# Patient Record
Sex: FEMALE | Race: WHITE | NOT HISPANIC OR LATINO | Employment: FULL TIME | URBAN - METROPOLITAN AREA
[De-identification: names, ages, dates, MRNs, and addresses within clinical notes are randomized per-mention and may not be internally consistent; named-entity substitution may affect disease eponyms.]

---

## 2017-10-27 ENCOUNTER — TELEPHONE (OUTPATIENT)
Dept: OBSTETRICS AND GYNECOLOGY | Age: 52
End: 2017-10-27

## 2018-07-03 ENCOUNTER — APPOINTMENT (OUTPATIENT)
Dept: WOMENS IMAGING | Facility: HOSPITAL | Age: 53
End: 2018-07-03

## 2018-07-03 ENCOUNTER — OFFICE VISIT (OUTPATIENT)
Dept: OBSTETRICS AND GYNECOLOGY | Age: 53
End: 2018-07-03

## 2018-07-03 VITALS
SYSTOLIC BLOOD PRESSURE: 118 MMHG | WEIGHT: 141 LBS | BODY MASS INDEX: 23.49 KG/M2 | HEIGHT: 65 IN | DIASTOLIC BLOOD PRESSURE: 70 MMHG

## 2018-07-03 DIAGNOSIS — Z78.0 MENOPAUSE: Primary | ICD-10-CM

## 2018-07-03 DIAGNOSIS — Z01.419 WELL WOMAN EXAM WITH ROUTINE GYNECOLOGICAL EXAM: ICD-10-CM

## 2018-07-03 DIAGNOSIS — N95.1 PERIMENOPAUSAL: ICD-10-CM

## 2018-07-03 DIAGNOSIS — R68.82 LIBIDO, DECREASED: ICD-10-CM

## 2018-07-03 PROCEDURE — 77067 SCR MAMMO BI INCL CAD: CPT | Performed by: RADIOLOGY

## 2018-07-03 PROCEDURE — 99396 PREV VISIT EST AGE 40-64: CPT | Performed by: OBSTETRICS & GYNECOLOGY

## 2018-07-03 PROCEDURE — 77063 BREAST TOMOSYNTHESIS BI: CPT | Performed by: RADIOLOGY

## 2018-07-03 RX ORDER — MECLIZINE HYDROCHLORIDE 25 MG/1
TABLET ORAL
COMMUNITY
Start: 2018-05-10

## 2018-07-03 RX ORDER — ONDANSETRON 4 MG/1
TABLET, FILM COATED ORAL
COMMUNITY
Start: 2018-05-10

## 2018-07-03 RX ORDER — HYDROCHLOROTHIAZIDE 25 MG/1
25 TABLET ORAL
COMMUNITY
Start: 2017-03-08 | End: 2020-09-28

## 2018-07-03 NOTE — PROGRESS NOTES
"Subjective   Chief Complaint   Patient presents with   • Gynecologic Exam     annual exam. c/o one episode a few weeks ago of spotting.      History of Present Illness    Yu Moise is a very pleasant  52 y.o. female who presents for annual exam.  , Mammo Exam today, Contraception pm, Exercise 3/week    C/o decreased libido, perimenop symptoms, irreg spottin once.    Obstetric History:  OB History     No data available         Menstrual History:     No LMP recorded. Patient has had an ablation.       Sexual History:       No past medical history on file.  No past surgical history on file.    SOCIAL Hx:      The following portions of the patient's history were reviewed and updated as appropriate: allergies, current medications, past family history, past medical history, past social history, past surgical history and problem list.    Review of Systems        Except as outlined in history of physical illness, patient denies any changes in her GYN, , GI systems.  All other systems reviewed are negative         Objective   Physical Exam    /70   Ht 165.1 cm (65\")   Wt 64 kg (141 lb)   Breastfeeding? No   BMI 23.46 kg/m²     General: Patient is alert and oriented and appears overall healthy  Neck: Is supple without thyromegaly, no carotid bruits and no lymphadenopathy  Lungs: Clear bilaterally, no wheezing, rhonchi, or rales.  Respiratory rate is normal  Breast: Even symmetrical, no lymphadenopathy, no retraction, no masses appreciated on either side  Heart: Regular rate and rhythm are appreciated, no murmurs or rubs are heard  Abdomen: Is soft, without organomegaly, bowel sounds are positive, there is no                                rebound or guarding and palpation does not produce any discomfort  Back: Nontender without CVA tenderness  Pelvic: External genitalia appear normal and consistent with mature female.  BUS normal              Urethra appears normal and without mass, bladder is nontender " and without any               Masses.               Vagina is clean dry without discharge and appears adequately estrogenized, no               lesions or masses are present                         Cervix is noninflamed without discharge or lesions.  There is no cervical motion             tenderness.                Uterus is nonenlarged, without tenderness, and no masses or abnormalities are  present               Adnexa are non-enlarged, non tender               Rectal exam reveals adequate sphincter tone and no masses or lesions are                     appreciated on digital rectal examination.     There is no problem list on file for this patient.               Assessment/Plan   Yu was seen today for gynecologic exam.    Diagnoses and all orders for this visit:    Menopause  -     Follicle Stimulating Hormone  -     Estradiol  -     Testosterone - total  -     Ambulatory Referral to Colorectal Surgery    Perimenopausal    Well woman exam with routine gynecological exam  -     IGP, Apt HPV,rfx 16 / 18,45    Libido, decreased    call with any more spottin    Annual Well Woman Exam    Discussed today's findings and concerns with patient in detail.  Continue to recommend regular exercise to include cardiovascular and resistance training and breast self-exam. Wellness lab, mammography, & pap smear, in accordance with age guidelines.  Dietary and caloric recommendations were discussed.        All of the patient's questions were addressed and answered, I have encouraged her to call for today's test results if she has not received them within 10 days.  Patient is advised to call with any change in her condition or with any other questions, otherwise return in 12 months for annual examination.

## 2018-07-04 LAB
ESTRADIOL SERPL-MCNC: <5 PG/ML
FSH SERPL-ACNC: 95.7 MIU/ML
TESTOST SERPL-MCNC: 5 NG/DL (ref 3–41)

## 2018-07-06 LAB
CYTOLOGIST CVX/VAG CYTO: NORMAL
CYTOLOGY CVX/VAG DOC THIN PREP: NORMAL
DX ICD CODE: NORMAL
HIV 1 & 2 AB SER-IMP: NORMAL
HPV I/H RISK 4 DNA CVX QL PROBE+SIG AMP: NEGATIVE
OTHER STN SPEC: NORMAL
PATH REPORT.FINAL DX SPEC: NORMAL
STAT OF ADQ CVX/VAG CYTO-IMP: NORMAL

## 2019-07-09 ENCOUNTER — PROCEDURE VISIT (OUTPATIENT)
Dept: OBSTETRICS AND GYNECOLOGY | Age: 54
End: 2019-07-09

## 2019-07-09 ENCOUNTER — OFFICE VISIT (OUTPATIENT)
Dept: OBSTETRICS AND GYNECOLOGY | Age: 54
End: 2019-07-09

## 2019-07-09 ENCOUNTER — APPOINTMENT (OUTPATIENT)
Dept: WOMENS IMAGING | Facility: HOSPITAL | Age: 54
End: 2019-07-09

## 2019-07-09 VITALS
SYSTOLIC BLOOD PRESSURE: 120 MMHG | HEIGHT: 64 IN | WEIGHT: 142 LBS | DIASTOLIC BLOOD PRESSURE: 70 MMHG | BODY MASS INDEX: 24.24 KG/M2

## 2019-07-09 DIAGNOSIS — F52.0 LACK OF LIBIDO: ICD-10-CM

## 2019-07-09 DIAGNOSIS — R63.5 WEIGHT GAIN FINDING: ICD-10-CM

## 2019-07-09 DIAGNOSIS — R41.3 DECLINE IN VERBAL MEMORY: ICD-10-CM

## 2019-07-09 DIAGNOSIS — Z01.419 ENCOUNTER FOR GYNECOLOGICAL EXAMINATION: Primary | ICD-10-CM

## 2019-07-09 DIAGNOSIS — Z12.31 VISIT FOR SCREENING MAMMOGRAM: Primary | ICD-10-CM

## 2019-07-09 DIAGNOSIS — Z71.89 COUNSELING FOR HORMONE REPLACEMENT THERAPY: ICD-10-CM

## 2019-07-09 PROCEDURE — 77067 SCR MAMMO BI INCL CAD: CPT | Performed by: OBSTETRICS & GYNECOLOGY

## 2019-07-09 PROCEDURE — 99396 PREV VISIT EST AGE 40-64: CPT | Performed by: OBSTETRICS & GYNECOLOGY

## 2019-07-09 PROCEDURE — 77067 SCR MAMMO BI INCL CAD: CPT | Performed by: RADIOLOGY

## 2019-07-09 RX ORDER — PREDNISONE 10 MG/1
TABLET ORAL
COMMUNITY
Start: 2019-04-05

## 2019-07-09 RX ORDER — VALACYCLOVIR HYDROCHLORIDE 1 G/1
2000 TABLET, FILM COATED ORAL
COMMUNITY
Start: 2016-10-26

## 2019-07-09 RX ORDER — BUPROPION HYDROCHLORIDE 150 MG/1
150 TABLET ORAL DAILY
COMMUNITY
Start: 2016-08-03 | End: 2021-10-28 | Stop reason: SDUPTHER

## 2019-07-09 RX ORDER — CETIRIZINE HYDROCHLORIDE 10 MG/1
10 TABLET ORAL DAILY
COMMUNITY

## 2019-07-09 RX ORDER — FLUTICASONE PROPIONATE 50 MCG
2 SPRAY, SUSPENSION (ML) NASAL DAILY
COMMUNITY

## 2019-07-09 NOTE — PROGRESS NOTES
"Subjective   Chief Complaint   Patient presents with   • Gynecologic Exam     Annual:last pap 7/2018,mammo here today,colonoscopy 2009,on compounded hrt      History of Present Illness    Yu Moise is a very pleasant  53 y.o. female who presents for annual exam.  , Mammo Exam today, , Exercise 5 times a week  Patient continues to struggle with some memory decline, not losing weight is easily she should, and decreased libido.  She has seen a compounding pharmacy and we reviewed the risk benefits of that.    Obstetric History:  OB History     No data available         Menstrual History:     No LMP recorded. Patient has had an ablation.       Sexual History:       No past medical history on file.  No past surgical history on file.    SOCIAL Hx:      The following portions of the patient's history were reviewed and updated as appropriate: allergies, current medications, past family history, past medical history, past social history, past surgical history and problem list.    Review of Systems        Except as outlined in history of physical illness, patient denies any changes in her GYN, , GI systems.  All other systems reviewed are negative         Objective   Physical Exam    /70   Ht 162.6 cm (64\")   Wt 64.4 kg (142 lb)   BMI 24.37 kg/m²     General: Patient is alert and oriented and appears overall healthy  Neck: Is supple without thyromegaly, no carotid bruits and no lymphadenopathy  Lungs: Clear bilaterally, no wheezing, rhonchi, or rales.  Respiratory rate is normal  Breast: Even symmetrical, no lymphadenopathy, no retraction, no masses appreciated on either side well-healed surgical scars  Heart: Regular rate and rhythm are appreciated, no murmurs or rubs are heard  Abdomen: Is soft, without organomegaly, bowel sounds are positive, there is no                                rebound or guarding and palpation does not produce any discomfort  Back: Nontender without CVA tenderness  Pelvic: " External genitalia appear normal and consistent with mature female.  BUS normal              Urethra appears normal and without mass, bladder is nontender and without any               Masses.               Vagina is clean dry without discharge and appears adequately estrogenized, no               lesions or masses are present                         Cervix is noninflamed without discharge or lesions.  There is no cervical motion             tenderness.                Uterus is nonenlarged, without tenderness, and no masses or abnormalities are  present               Adnexa are non-enlarged, non tender               Rectal exam reveals adequate sphincter tone and no masses or lesions are                     appreciated on digital rectal examination.      Annual Well Woman Exam     Patient Active Problem List   Diagnosis   • Weight gain finding   • Lack of libido   • Decline in verbal memory                Assessment/Plan   Yu was seen today for gynecologic exam.    Diagnoses and all orders for this visit:    Encounter for gynecological examination  -     IGP, Apt HPV,rfx 16 / 18,45  -     CBC (No Diff)  -     Comprehensive Metabolic Panel  -     DHEA-Sulfate  -     Estradiol  -     Progesterone  -     Testosterone (Free & Total), LC / MS  -     Lipid Panel  -     Thyroid Panel With TSH  -     Ambulatory Referral to Neuropsychology    Counseling for hormone replacement therapy  -     CBC (No Diff)  -     Comprehensive Metabolic Panel  -     DHEA-Sulfate  -     Estradiol  -     Progesterone  -     Testosterone (Free & Total), LC / MS  -     Lipid Panel  -     Thyroid Panel With TSH    Decline in verbal memory  Referral to neuropsychology  Lack of libido  Checking labs  Weight gain finding  Patient declined nutritional consultation        Discussed today's findings and concerns with patient.  Continue to recommend regular exercise including cardiovascular and resistance training as well as  breast self-exam.  Wellness lab, mammography, & pap smear, in accordance with age guidelines.        All of the patient's questions were addressed and answered, I have encouraged her to call for today's test results if she has not received them within 10 days.  Patient is advised to call with any change in her condition or with any other questions, otherwise return in 12 months for annual examination.

## 2019-07-11 LAB
CYTOLOGIST CVX/VAG CYTO: NORMAL
CYTOLOGY CVX/VAG DOC CYTO: NORMAL
CYTOLOGY CVX/VAG DOC THIN PREP: NORMAL
DX ICD CODE: NORMAL
HIV 1 & 2 AB SER-IMP: NORMAL
HPV I/H RISK 4 DNA CVX QL PROBE+SIG AMP: NEGATIVE
OTHER STN SPEC: NORMAL
STAT OF ADQ CVX/VAG CYTO-IMP: NORMAL

## 2019-07-12 LAB
ALBUMIN SERPL-MCNC: 4.3 G/DL (ref 3.5–5.2)
ALBUMIN/GLOB SERPL: 2 G/DL
ALP SERPL-CCNC: 53 U/L (ref 39–117)
ALT SERPL-CCNC: 23 U/L (ref 1–33)
AST SERPL-CCNC: 17 U/L (ref 1–32)
BILIRUB SERPL-MCNC: 0.5 MG/DL (ref 0.2–1.2)
BUN SERPL-MCNC: 9 MG/DL (ref 6–20)
BUN/CREAT SERPL: 14.8 (ref 7–25)
CALCIUM SERPL-MCNC: 9.8 MG/DL (ref 8.6–10.5)
CHLORIDE SERPL-SCNC: 100 MMOL/L (ref 98–107)
CHOLEST SERPL-MCNC: 233 MG/DL (ref 0–200)
CO2 SERPL-SCNC: 27.2 MMOL/L (ref 22–29)
CREAT SERPL-MCNC: 0.61 MG/DL (ref 0.57–1)
DHEA-S SERPL-MCNC: 11.2 UG/DL (ref 41.2–243.7)
ERYTHROCYTE [DISTWIDTH] IN BLOOD BY AUTOMATED COUNT: 12.1 % (ref 12.3–15.4)
ESTRADIOL SERPL-MCNC: <5 PG/ML
FT4I SERPL CALC-MCNC: 1.5 (ref 1.2–4.9)
GLOBULIN SER CALC-MCNC: 2.2 GM/DL
GLUCOSE SERPL-MCNC: 85 MG/DL (ref 65–99)
HCT VFR BLD AUTO: 44 % (ref 34–46.6)
HDLC SERPL-MCNC: 75 MG/DL (ref 40–60)
HGB BLD-MCNC: 13.7 G/DL (ref 12–15.9)
LDLC SERPL CALC-MCNC: 141 MG/DL (ref 0–100)
MCH RBC QN AUTO: 31.5 PG (ref 26.6–33)
MCHC RBC AUTO-ENTMCNC: 31.1 G/DL (ref 31.5–35.7)
MCV RBC AUTO: 101.1 FL (ref 79–97)
PLATELET # BLD AUTO: 283 10*3/MM3 (ref 140–450)
POTASSIUM SERPL-SCNC: 4.2 MMOL/L (ref 3.5–5.2)
PROGEST SERPL-MCNC: 1.2 NG/ML
PROT SERPL-MCNC: 6.5 G/DL (ref 6–8.5)
RBC # BLD AUTO: 4.35 10*6/MM3 (ref 3.77–5.28)
SODIUM SERPL-SCNC: 140 MMOL/L (ref 136–145)
T3RU NFR SERPL: 25 % (ref 24–39)
T4 SERPL-MCNC: 6 UG/DL (ref 4.5–12)
TESTOST FREE SERPL-MCNC: <0.2 PG/ML (ref 0–4.2)
TESTOST SERPL-MCNC: 41.5 NG/DL
TRIGL SERPL-MCNC: 83 MG/DL (ref 0–150)
TSH SERPL DL<=0.005 MIU/L-ACNC: 0.73 UIU/ML (ref 0.45–4.5)
VLDLC SERPL CALC-MCNC: 16.6 MG/DL
WBC # BLD AUTO: 9.55 10*3/MM3 (ref 3.4–10.8)

## 2019-07-15 ENCOUNTER — TELEPHONE (OUTPATIENT)
Dept: OBSTETRICS AND GYNECOLOGY | Age: 54
End: 2019-07-15

## 2019-07-15 NOTE — TELEPHONE ENCOUNTER
----- Message from Yogesh Nair MD sent at 7/15/2019  9:10 AM EDT -----  Please notify patient that her lab work is back and we can fax that to her compounding pharmacy

## 2019-11-27 ENCOUNTER — TELEPHONE (OUTPATIENT)
Dept: OBSTETRICS AND GYNECOLOGY | Age: 54
End: 2019-11-27

## 2020-09-28 ENCOUNTER — OFFICE VISIT (OUTPATIENT)
Dept: OBSTETRICS AND GYNECOLOGY | Age: 55
End: 2020-09-28

## 2020-09-28 VITALS
DIASTOLIC BLOOD PRESSURE: 62 MMHG | SYSTOLIC BLOOD PRESSURE: 118 MMHG | HEIGHT: 64 IN | BODY MASS INDEX: 24.41 KG/M2 | WEIGHT: 143 LBS

## 2020-09-28 DIAGNOSIS — F52.0 LACK OF LIBIDO: ICD-10-CM

## 2020-09-28 DIAGNOSIS — Z01.419 WELL WOMAN EXAM WITH ROUTINE GYNECOLOGICAL EXAM: Primary | ICD-10-CM

## 2020-09-28 DIAGNOSIS — Z12.11 COLON CANCER SCREENING: ICD-10-CM

## 2020-09-28 PROCEDURE — 99396 PREV VISIT EST AGE 40-64: CPT | Performed by: PHYSICIAN ASSISTANT

## 2020-09-28 NOTE — PROGRESS NOTES
Subjective     Chief Complaint   Patient presents with   • Gynecologic Exam     annual exam last pap 09/09/2019, m/g 07/09/2019 neg, c/scope 2009       History of Present Illness    Yu Moise is a 54 y.o. No obstetric history on file. who presents for annual exam.    She is doing ok  Notes decreased libido  Similar to last year  Was on bioidentical hormones but stopped them b/c she didn't notice a difference and it was costly  Has used testosterone topically in the past and thought she had some improvement in her sxs'    Memory loss has been noted, verbal recall  Referred to Neuropsychology last year but didn't go d/t money  Would like the name again and may f/u    Does stay active, walks, bikes and does yoga  Is eating right as well    Has PCP, keeps changing  Only goes when sick    Would like same labs ordered as last year   Would like to see how they look  Will add cholesterol level as well    Pt of Dr Nair  Obstetric History:  OB History    No obstetric history on file.        Menstrual History:     No LMP recorded. Patient has had an ablation.         Current contraception: post menopausal status  History of abnormal Pap smear: no  Received Gardasil immunization: no  Perform regular self breast exam: yes - mth;y  Family history of uterine or ovarian cancer: no  Family History of colon cancer: no  Family history of breast cancer: no    Mammogram: up to date.  Colonoscopy: recommended.  DEXA: not indicated.    Exercise: moderately active  Calcium/Vitamin D: adequate intake    The following portions of the patient's history were reviewed and updated as appropriate: allergies, current medications, past family history, past medical history, past social history, past surgical history and problem list.    Review of Systems   All other systems reviewed and are negative.      Review of Systems   Constitutional: Negative for fatigue.   Respiratory: Negative for shortness of breath.    Gastrointestinal: Negative  "for abdominal pain.   Genitourinary: Negative for dysuria.   Neurological: Negative for headaches.   Psychiatric/Behavioral: Negative for dysphoric mood.         Objective   Physical Exam    /62   Ht 162.6 cm (64\")   Wt 64.9 kg (143 lb)   Breastfeeding No   BMI 24.55 kg/m²   General:   alert, comfortable and no distress   Heart: regular rate and rhythm   Lungs: clear to auscultation bilaterally   Breast: normal appearance, no masses or tenderness, Inspection negative, No nipple retraction or dimpling, No nipple discharge or bleeding, No axillary or supraclavicular adenopathy, Normal to palpation without dominant masses, s/p reduction, scars noted and scar tissue detected, no abnormalities noted   Neck: no adenopathy and no thyromegaly   Abdomen: {normal findings: soft, non-tender   CVA: Not performed today   Pelvis: External genitalia: normal general appearance  Vaginal: normal mucosa without prolapse or lesions and atrophic mucosa  Cervix: normal appearance  Adnexa: normal bimanual exam  Uterus: normal single, nontender  Rectal: good sphincter tone, no masses, guaiac negative   Extremities: Not performed today   Neurologic: negative   Psychiatric: Normal affect, judgement, and mood     Assessment/Plan   Yu was seen today for gynecologic exam.    Diagnoses and all orders for this visit:    Well woman exam with routine gynecological exam  -     CBC & Differential  -     TSH  -     T4  -     T3, Uptake  -     T3 Uptake & FTI  -     Estradiol  -     Vitamin D 25 Hydroxy  -     Follicle Stimulating Hormone  -     Testosterone - total  -     Lipid Panel  -     DHEA-Sulfate  -     Comprehensive Metabolic Panel    Colon cancer screening  -     Ambulatory Referral to Gastroenterology    Lack of libido        All questions answered.  Breast self exam technique reviewed and patient encouraged to perform self-exam monthly.  Discussed healthy lifestyle modifications.  Recommended 30 minutes of aerobic exercise " five times per week.  Discussed calcium needs to prevent osteoporosis.      Labs ordered for pt  Referral sent for CSC  rx written for testosterone topically  C/w good diet and exercise

## 2020-09-29 ENCOUNTER — TELEPHONE (OUTPATIENT)
Dept: OBSTETRICS AND GYNECOLOGY | Age: 55
End: 2020-09-29

## 2020-09-29 LAB
25(OH)D3+25(OH)D2 SERPL-MCNC: 53 NG/ML (ref 30–100)
ALBUMIN SERPL-MCNC: 4.4 G/DL (ref 3.5–5.2)
ALBUMIN/GLOB SERPL: 2.4 G/DL
ALP SERPL-CCNC: 66 U/L (ref 39–117)
ALT SERPL-CCNC: 19 U/L (ref 1–33)
AST SERPL-CCNC: 20 U/L (ref 1–32)
BASOPHILS # BLD AUTO: 0.03 10*3/MM3 (ref 0–0.2)
BASOPHILS NFR BLD AUTO: 0.5 % (ref 0–1.5)
BILIRUB SERPL-MCNC: 0.4 MG/DL (ref 0–1.2)
BUN SERPL-MCNC: 16 MG/DL (ref 6–20)
BUN/CREAT SERPL: 18.6 (ref 7–25)
CALCIUM SERPL-MCNC: 9.3 MG/DL (ref 8.6–10.5)
CHLORIDE SERPL-SCNC: 102 MMOL/L (ref 98–107)
CHOLEST SERPL-MCNC: 217 MG/DL (ref 0–200)
CO2 SERPL-SCNC: 25.5 MMOL/L (ref 22–29)
CREAT SERPL-MCNC: 0.86 MG/DL (ref 0.57–1)
DHEA-S SERPL-MCNC: 10.5 UG/DL (ref 29.4–220.5)
EOSINOPHIL # BLD AUTO: 0.22 10*3/MM3 (ref 0–0.4)
EOSINOPHIL NFR BLD AUTO: 3.9 % (ref 0.3–6.2)
ERYTHROCYTE [DISTWIDTH] IN BLOOD BY AUTOMATED COUNT: 11.7 % (ref 12.3–15.4)
ESTRADIOL SERPL-MCNC: <5 PG/ML
FSH SERPL-ACNC: 105 MIU/ML
FT4I SERPL CALC-MCNC: 1.4 (ref 1.2–4.9)
GLOBULIN SER CALC-MCNC: 1.8 GM/DL
GLUCOSE SERPL-MCNC: 90 MG/DL (ref 65–99)
HCT VFR BLD AUTO: 40.9 % (ref 34–46.6)
HDLC SERPL-MCNC: 68 MG/DL (ref 40–60)
HGB BLD-MCNC: 13.9 G/DL (ref 12–15.9)
IMM GRANULOCYTES # BLD AUTO: 0.04 10*3/MM3 (ref 0–0.05)
IMM GRANULOCYTES NFR BLD AUTO: 0.7 % (ref 0–0.5)
LDLC SERPL CALC-MCNC: 125 MG/DL (ref 0–100)
LYMPHOCYTES # BLD AUTO: 1.81 10*3/MM3 (ref 0.7–3.1)
LYMPHOCYTES NFR BLD AUTO: 31.8 % (ref 19.6–45.3)
MCH RBC QN AUTO: 31.7 PG (ref 26.6–33)
MCHC RBC AUTO-ENTMCNC: 34 G/DL (ref 31.5–35.7)
MCV RBC AUTO: 93.2 FL (ref 79–97)
MONOCYTES # BLD AUTO: 0.4 10*3/MM3 (ref 0.1–0.9)
MONOCYTES NFR BLD AUTO: 7 % (ref 5–12)
NEUTROPHILS # BLD AUTO: 3.19 10*3/MM3 (ref 1.7–7)
NEUTROPHILS NFR BLD AUTO: 56.1 % (ref 42.7–76)
NRBC BLD AUTO-RTO: 0 /100 WBC (ref 0–0.2)
PLATELET # BLD AUTO: 284 10*3/MM3 (ref 140–450)
POTASSIUM SERPL-SCNC: 3.8 MMOL/L (ref 3.5–5.2)
PROT SERPL-MCNC: 6.2 G/DL (ref 6–8.5)
RBC # BLD AUTO: 4.39 10*6/MM3 (ref 3.77–5.28)
SODIUM SERPL-SCNC: 140 MMOL/L (ref 136–145)
T3RU NFR SERPL: 24 % (ref 24–39)
T4 SERPL-MCNC: 5.7 UG/DL (ref 4.5–12)
TESTOST SERPL-MCNC: <3 NG/DL (ref 3–41)
TRIGL SERPL-MCNC: 119 MG/DL (ref 0–150)
TSH SERPL DL<=0.005 MIU/L-ACNC: 1.07 UIU/ML (ref 0.27–4.2)
VLDLC SERPL CALC-MCNC: 23.8 MG/DL
WBC # BLD AUTO: 5.69 10*3/MM3 (ref 3.4–10.8)

## 2021-10-21 ENCOUNTER — TELEPHONE (OUTPATIENT)
Dept: OBSTETRICS AND GYNECOLOGY | Age: 56
End: 2021-10-21

## 2021-10-21 NOTE — TELEPHONE ENCOUNTER
Dr Nair pt  PT calls needing to schedule annual exam. Pt scheduled next week 10/28 with Maame Malloy. Pt asking her testosterone cream refill be sent in, please advise

## 2021-10-22 NOTE — TELEPHONE ENCOUNTER
Pt states pharmacy should have sent over a new refill request, last request was from 02/2021 and these are done every 6 months. Spoke to [facility] and they are sending a new refill request on pt testosterone cream today

## 2021-10-28 ENCOUNTER — OFFICE VISIT (OUTPATIENT)
Dept: OBSTETRICS AND GYNECOLOGY | Age: 56
End: 2021-10-28

## 2021-10-28 VITALS
DIASTOLIC BLOOD PRESSURE: 64 MMHG | WEIGHT: 145 LBS | BODY MASS INDEX: 25.69 KG/M2 | SYSTOLIC BLOOD PRESSURE: 118 MMHG | HEIGHT: 63 IN

## 2021-10-28 DIAGNOSIS — Z01.419 WELL WOMAN EXAM WITH ROUTINE GYNECOLOGICAL EXAM: Primary | ICD-10-CM

## 2021-10-28 DIAGNOSIS — Z78.0 POST-MENOPAUSAL: ICD-10-CM

## 2021-10-28 PROCEDURE — 99396 PREV VISIT EST AGE 40-64: CPT | Performed by: PHYSICIAN ASSISTANT

## 2021-10-28 RX ORDER — BUPROPION HYDROCHLORIDE 300 MG/1
TABLET ORAL
COMMUNITY
Start: 2021-08-20

## 2021-10-28 NOTE — PROGRESS NOTES
"Subjective     Chief Complaint   Patient presents with   • Gynecologic Exam     annual exam last pap 07/09/2019 neg/neg, m/g 2019       History of Present Illness    Yu Moise is a 55 y.o. No obstetric history on file. who presents for annual exam.    She is doing well  Trying to eat right and take care of herself  Is noting difficulty with maintaining a healthy weight  Tries to stay active, hikes, etc    Overdue for mammogram  Needs routine  labs with PCP  Last done with me last year  Did have slightly elevated cholesterol at that time    Obstetric History:  OB History    No obstetric history on file.        Menstrual History:     No LMP recorded. Patient has had an ablation.         Current contraception: post menopausal status  History of abnormal Pap smear: no  Received Gardasil immunization: no  Perform regular self breast exam: yes - occl  Family history of uterine or ovarian cancer: no  Family History of colon cancer: no  Family history of breast cancer: no    Mammogram: ordered.  Colonoscopy: recommended.  DEXA: not indicated.    Exercise: moderately active  Calcium/Vitamin D: adequate intake    The following portions of the patient's history were reviewed and updated as appropriate: allergies, current medications, past family history, past medical history, past social history, past surgical history and problem list.    Review of Systems    Review of Systems   Constitutional: Negative for fatigue.   Respiratory: Negative for shortness of breath.    Gastrointestinal: Negative for abdominal pain.   Genitourinary: Negative for dysuria.   Neurological: Negative for headaches.   Psychiatric/Behavioral: Negative for dysphoric mood.         Objective   Physical Exam    /64   Ht 160 cm (63\")   Wt 65.8 kg (145 lb)   Breastfeeding No   BMI 25.69 kg/m²   General:   alert, comfortable and no distress   Heart: regular rate and rhythm   Lungs: clear to auscultation bilaterally   Breast: normal " appearance, no masses or tenderness, Inspection negative, No nipple retraction or dimpling, No nipple discharge or bleeding, No axillary or supraclavicular adenopathy, Normal to palpation without dominant masses   Neck: no adenopathy and no carotid bruit   Abdomen: {normal findings: soft, non-tender   CVA: Not performed today   Pelvis: External genitalia: normal general appearance  Vaginal: normal mucosa without prolapse or lesions  Cervix: normal appearance  Adnexa: normal bimanual exam  Uterus: normal single, nontender   Extremities: Not performed today   Neurologic: negative   Psychiatric: Normal affect, judgement, and mood     Assessment/Plan   Diagnoses and all orders for this visit:    1. Well woman exam with routine gynecological exam (Primary)    2. Post-menopausal        All questions answered.  Breast self exam technique reviewed and patient encouraged to perform self-exam monthly.  Discussed healthy lifestyle modifications.  Recommended 30 minutes of aerobic exercise five times per week.  Discussed calcium needs to prevent osteoporosis.    Pap utd  Mammogram recommended  C/w good diet and exercise   Fu with PCP for routine exam

## 2022-08-23 ENCOUNTER — TELEPHONE (OUTPATIENT)
Dept: OBSTETRICS AND GYNECOLOGY | Age: 57
End: 2022-08-23

## 2022-11-14 ENCOUNTER — OFFICE VISIT (OUTPATIENT)
Dept: OBSTETRICS AND GYNECOLOGY | Age: 57
End: 2022-11-14

## 2022-11-14 VITALS
HEIGHT: 63 IN | WEIGHT: 144 LBS | DIASTOLIC BLOOD PRESSURE: 78 MMHG | SYSTOLIC BLOOD PRESSURE: 122 MMHG | BODY MASS INDEX: 25.52 KG/M2

## 2022-11-14 DIAGNOSIS — Z13.0 SCREENING FOR IRON DEFICIENCY ANEMIA: ICD-10-CM

## 2022-11-14 DIAGNOSIS — Z12.11 SCREENING FOR COLON CANCER: ICD-10-CM

## 2022-11-14 DIAGNOSIS — Z11.51 SCREENING FOR HPV (HUMAN PAPILLOMAVIRUS): ICD-10-CM

## 2022-11-14 DIAGNOSIS — R41.3 DECLINE IN VERBAL MEMORY: ICD-10-CM

## 2022-11-14 DIAGNOSIS — Z13.220 SCREENING FOR CHOLESTEROL LEVEL: ICD-10-CM

## 2022-11-14 DIAGNOSIS — Z13.29 SCREENING FOR THYROID DISORDER: ICD-10-CM

## 2022-11-14 DIAGNOSIS — Z13.1 SCREENING FOR DIABETES MELLITUS: ICD-10-CM

## 2022-11-14 DIAGNOSIS — N95.1 MENOPAUSAL SYMPTOMS: ICD-10-CM

## 2022-11-14 DIAGNOSIS — Z12.4 ENCOUNTER FOR PAPANICOLAOU SMEAR FOR CERVICAL CANCER SCREENING: ICD-10-CM

## 2022-11-14 DIAGNOSIS — Z01.419 WELL WOMAN EXAM WITH ROUTINE GYNECOLOGICAL EXAM: Primary | ICD-10-CM

## 2022-11-14 PROCEDURE — 99396 PREV VISIT EST AGE 40-64: CPT | Performed by: PHYSICIAN ASSISTANT

## 2022-11-14 RX ORDER — HYDROCHLOROTHIAZIDE 25 MG/1
25 TABLET ORAL EVERY MORNING
COMMUNITY
Start: 2022-10-25

## 2022-11-14 RX ORDER — ESTRADIOL/NORETHINDRONE ACETATE TRANSDERMAL SYSTEM .05; .14 MG/D; MG/D
1 PATCH, EXTENDED RELEASE TRANSDERMAL 2 TIMES WEEKLY
Qty: 8 EACH | Refills: 3 | Status: SHIPPED | OUTPATIENT
Start: 2022-11-14 | End: 2022-11-14

## 2022-11-14 RX ORDER — ESTRADIOL AND NORETHINDRONE ACETATE .5; .1 MG/1; MG/1
1 TABLET ORAL DAILY
Qty: 30 TABLET | Refills: 12 | Status: SHIPPED | OUTPATIENT
Start: 2022-11-14 | End: 2023-11-14

## 2022-11-14 NOTE — PROGRESS NOTES
"Subjective     Chief Complaint   Patient presents with   • Gynecologic Exam     Annual exam last pap 07/09/2019 neg/neg, m/g needs to be scheduled. C/scope never       History of Present Illness    Yu Moise is a 56 y.o. No obstetric history on file. who presents for annual exam.    She is here for annual exam    Has noted some attention issues  \"lack of attention to detail\"  Correlates it with onset of MP  Not on HT  Uses testosterone clitorally for decreased libido  Would be interested in trying HT for memory improvement    She is healthy  Does see pcp routinely  Requests a panel of labs that she gets yearly as she likes to compare results    Overdue for CSC  Is open to getting it done    Does need to schedule mammogram     Obstetric History:  OB History    No obstetric history on file.        Menstrual History:     No LMP recorded. Patient has had an ablation.         Current contraception: post menopausal status  History of abnormal Pap smear: no  Received Gardasil immunization: no  Perform regular self breast exam: yes - mthly  Family history of uterine or ovarian cancer: no  Family History of colon cancer: no  Family history of breast cancer: no    Mammogram: ordered.  Colonoscopy: recommended.  DEXA: not indicated.    Exercise: moderately active or not active  Calcium/Vitamin D: adequate intake    The following portions of the patient's history were reviewed and updated as appropriate: allergies, current medications, past family history, past medical history, past social history, past surgical history and problem list.    Review of Systems   Psychiatric/Behavioral:        Attention issues     All other systems reviewed and are negative.      Review of Systems   Constitutional: Negative for fatigue.   Respiratory: Negative for shortness of breath.    Gastrointestinal: Negative for abdominal pain.   Genitourinary: Negative for dysuria.   Neurological: Negative for headaches.   Psychiatric/Behavioral: " "Negative for dysphoric mood.         Objective   Physical Exam    /78   Ht 160 cm (63\")   Wt 65.3 kg (144 lb)   BMI 25.51 kg/m²   General:   alert, comfortable and no distress   Heart: regular rate and rhythm   Lungs: clear to auscultation bilaterally   Breast: normal appearance, no masses or tenderness, Inspection negative, No nipple retraction or dimpling, No nipple discharge or bleeding, No axillary or supraclavicular adenopathy, Normal to palpation without dominant masses, positive findings: fibrocystic changes   Neck: no adenopathy and no carotid bruit   Abdomen: normal findings: soft, non-tender   CVA: Not performed today   Pelvis: External genitalia: normal general appearance  Vaginal: normal mucosa without prolapse or lesions  Cervix: normal appearance and thin prep PAP obtained  Adnexa: normal bimanual exam  Uterus: normal single, nontender   Extremities: Not performed today   Neurologic: negative   Psychiatric: Normal affect, judgement, and mood     Assessment & Plan   Diagnoses and all orders for this visit:    1. Well woman exam with routine gynecological exam (Primary)    2. Screening for colon cancer  -     Ambulatory Referral For Screening Colonoscopy    3. Screening for diabetes mellitus  -     Comprehensive Metabolic Panel    4. Screening for thyroid disorder  -     TSH  -     T4  -     T3, Uptake  -     T3 Uptake & FTI    5. Screening for cholesterol level  -     Lipid Panel    6. Decline in verbal memory  -     Vitamin D 25 Hydroxy    7. Screening for iron deficiency anemia  -     CBC & Differential    8. Menopausal symptoms  -     DHEA-Sulfate  -     Testosterone - total    9. Encounter for Papanicolaou smear for cervical cancer screening  -     IGP, Aptima HPV, Rfx 16 / 18,45    10. Screening for HPV (human papillomavirus)  -     IGP, Aptima HPV, Rfx 16 / 18,45    Other orders  -     estradiol-norethindrone (CombiPatch) 0.05-0.14 MG/DAY patch; Place 1 patch on the skin as directed by " provider 2 (Two) Times a Week.  Dispense: 8 each; Refill: 3        All questions answered.  Breast self exam technique reviewed and patient encouraged to perform self-exam monthly.  Discussed healthy lifestyle modifications.  Recommended 30 minutes of aerobic exercise five times per week.  Discussed calcium needs to prevent osteoporosis.    Pap done  Referral for [facility] labs and hormone labs done at pt request  Start HRT for memory changes, disc r/b/a of HT use. Pt aware and wishes to proceed

## 2022-11-15 LAB
25(OH)D3+25(OH)D2 SERPL-MCNC: 55.3 NG/ML (ref 30–100)
ALBUMIN SERPL-MCNC: 4.1 G/DL (ref 3.5–5.2)
ALBUMIN/GLOB SERPL: 2 G/DL
ALP SERPL-CCNC: 59 U/L (ref 39–117)
ALT SERPL-CCNC: 18 U/L (ref 1–33)
AST SERPL-CCNC: 16 U/L (ref 1–32)
BASOPHILS # BLD AUTO: 0.03 10*3/MM3 (ref 0–0.2)
BASOPHILS NFR BLD AUTO: 0.5 % (ref 0–1.5)
BILIRUB SERPL-MCNC: 0.5 MG/DL (ref 0–1.2)
BUN SERPL-MCNC: 13 MG/DL (ref 6–20)
BUN/CREAT SERPL: 12.5 (ref 7–25)
CALCIUM SERPL-MCNC: 9.6 MG/DL (ref 8.6–10.5)
CHLORIDE SERPL-SCNC: 103 MMOL/L (ref 98–107)
CHOLEST SERPL-MCNC: 242 MG/DL (ref 0–200)
CO2 SERPL-SCNC: 28.8 MMOL/L (ref 22–29)
CREAT SERPL-MCNC: 1.04 MG/DL (ref 0.57–1)
DHEA-S SERPL-MCNC: 9.5 UG/DL (ref 29.4–220.5)
EGFRCR SERPLBLD CKD-EPI 2021: 62.8 ML/MIN/1.73
EOSINOPHIL # BLD AUTO: 0.44 10*3/MM3 (ref 0–0.4)
EOSINOPHIL NFR BLD AUTO: 7.1 % (ref 0.3–6.2)
ERYTHROCYTE [DISTWIDTH] IN BLOOD BY AUTOMATED COUNT: 11.5 % (ref 12.3–15.4)
FT4I SERPL CALC-MCNC: 1.3 (ref 1.2–4.9)
GLOBULIN SER CALC-MCNC: 2.1 GM/DL
GLUCOSE SERPL-MCNC: 88 MG/DL (ref 65–99)
HCT VFR BLD AUTO: 42.8 % (ref 34–46.6)
HDLC SERPL-MCNC: 60 MG/DL (ref 40–60)
HGB BLD-MCNC: 14.4 G/DL (ref 12–15.9)
IMM GRANULOCYTES # BLD AUTO: 0.03 10*3/MM3 (ref 0–0.05)
IMM GRANULOCYTES NFR BLD AUTO: 0.5 % (ref 0–0.5)
LDLC SERPL CALC-MCNC: 165 MG/DL (ref 0–100)
LYMPHOCYTES # BLD AUTO: 1.99 10*3/MM3 (ref 0.7–3.1)
LYMPHOCYTES NFR BLD AUTO: 32 % (ref 19.6–45.3)
MCH RBC QN AUTO: 31.6 PG (ref 26.6–33)
MCHC RBC AUTO-ENTMCNC: 33.6 G/DL (ref 31.5–35.7)
MCV RBC AUTO: 94.1 FL (ref 79–97)
MONOCYTES # BLD AUTO: 0.47 10*3/MM3 (ref 0.1–0.9)
MONOCYTES NFR BLD AUTO: 7.6 % (ref 5–12)
NEUTROPHILS # BLD AUTO: 3.26 10*3/MM3 (ref 1.7–7)
NEUTROPHILS NFR BLD AUTO: 52.3 % (ref 42.7–76)
NRBC BLD AUTO-RTO: 0 /100 WBC (ref 0–0.2)
PLATELET # BLD AUTO: 321 10*3/MM3 (ref 140–450)
POTASSIUM SERPL-SCNC: 4.5 MMOL/L (ref 3.5–5.2)
PROT SERPL-MCNC: 6.2 G/DL (ref 6–8.5)
RBC # BLD AUTO: 4.55 10*6/MM3 (ref 3.77–5.28)
SODIUM SERPL-SCNC: 140 MMOL/L (ref 136–145)
T3RU NFR SERPL: 25 % (ref 24–39)
T4 SERPL-MCNC: 5 UG/DL (ref 4.5–12)
TESTOST SERPL-MCNC: <3 NG/DL (ref 4–50)
TRIGL SERPL-MCNC: 95 MG/DL (ref 0–150)
TSH SERPL DL<=0.005 MIU/L-ACNC: 1.44 UIU/ML (ref 0.27–4.2)
VLDLC SERPL CALC-MCNC: 17 MG/DL (ref 5–40)
WBC # BLD AUTO: 6.22 10*3/MM3 (ref 3.4–10.8)

## 2022-11-21 LAB
CYTOLOGIST CVX/VAG CYTO: NORMAL
CYTOLOGY CVX/VAG DOC CYTO: NORMAL
CYTOLOGY CVX/VAG DOC THIN PREP: NORMAL
DX ICD CODE: NORMAL
HIV 1 & 2 AB SER-IMP: NORMAL
HPV GENOTYPE REFLEX: NORMAL
HPV I/H RISK 4 DNA CVX QL PROBE+SIG AMP: NEGATIVE
OTHER STN SPEC: NORMAL
STAT OF ADQ CVX/VAG CYTO-IMP: NORMAL

## 2022-12-06 ENCOUNTER — PROCEDURE VISIT (OUTPATIENT)
Dept: OBSTETRICS AND GYNECOLOGY | Age: 57
End: 2022-12-06

## 2022-12-06 ENCOUNTER — APPOINTMENT (OUTPATIENT)
Dept: WOMENS IMAGING | Facility: HOSPITAL | Age: 57
End: 2022-12-06

## 2022-12-06 DIAGNOSIS — Z12.31 VISIT FOR SCREENING MAMMOGRAM: Primary | ICD-10-CM

## 2022-12-06 PROCEDURE — 77067 SCR MAMMO BI INCL CAD: CPT | Performed by: PHYSICIAN ASSISTANT

## 2022-12-06 PROCEDURE — 77063 BREAST TOMOSYNTHESIS BI: CPT | Performed by: PHYSICIAN ASSISTANT

## 2022-12-06 PROCEDURE — 77067 SCR MAMMO BI INCL CAD: CPT | Performed by: RADIOLOGY

## 2022-12-08 ENCOUNTER — PATIENT MESSAGE (OUTPATIENT)
Dept: OBSTETRICS AND GYNECOLOGY | Age: 57
End: 2022-12-08

## 2022-12-08 NOTE — TELEPHONE ENCOUNTER
From: Yu Moise  To: RICHMOND Forbes  Sent: 12/8/2022 8:44 AM EST  Subject: Compound Pharmacy waiting for approval    Aron Isabel, [facility] says they have submitted for a refill on the testosterone cream three times and has not heard back. Would you mind calling that in? The number is [phone]. Thank you,  Yu Moise (Hope)

## 2023-07-26 ENCOUNTER — PATIENT MESSAGE (OUTPATIENT)
Dept: OBSTETRICS AND GYNECOLOGY | Age: 58
End: 2023-07-26
Payer: COMMERCIAL

## 2023-07-26 NOTE — TELEPHONE ENCOUNTER
From: Yu Moise  To: Yogesh Nair  Sent: 7/26/2023 10:11 AM EDT  Subject: Bioidentical hrt    Hi Dr. Nair,  My last visit I saw Maame and she prescribed a combo patch. I had severe cramping and did not take it after trying the first few patches. I spoke to the pharmacist at [facility] and she said with your ok, I can have a consult with them for hormone replacement. You already have been prescribing testosterone cream I get from them and I would like to try this option.    On top of my brain fog and low libido I am now starting to hold on to weight around my middle despite a healthy diet and regular exercise. If I make an appointment with them, is that ok with you?    Thanks for your help,  Taylor Moise

## 2023-08-09 ENCOUNTER — TELEPHONE (OUTPATIENT)
Dept: OBSTETRICS AND GYNECOLOGY | Age: 58
End: 2023-08-09
Payer: COMMERCIAL

## 2023-08-09 NOTE — TELEPHONE ENCOUNTER
PROVIDER: DR. STOCKTON    Caller: Yu Moise    Relationship: Self    Best call back number: [phone]    What form or medical record are you requesting: NEEDS BLOODWORK RESULTS FAXED TO HER    Who is requesting this form or medical record from you: [facility]    How would you like to receive the form or medical records (pick-up, mail, fax): FAX  If fax, what is the fax number: [phone]  If mail, what is the address:   If pick-up, provide patient with address and location details    Timeframe paperwork needed: WHEN YOU CAN BUT THEY ARE WAITING FOR THIS    Additional notes:

## 2024-03-18 DIAGNOSIS — R53.83 OTHER FATIGUE: ICD-10-CM

## 2024-03-18 DIAGNOSIS — Z13.1 SCREENING FOR DIABETES MELLITUS: ICD-10-CM

## 2024-03-18 DIAGNOSIS — Z13.29 SCREENING FOR THYROID DISORDER: ICD-10-CM

## 2024-03-18 DIAGNOSIS — N95.1 MENOPAUSAL SYMPTOMS: Primary | ICD-10-CM

## 2024-03-25 ENCOUNTER — TELEPHONE (OUTPATIENT)
Dept: OBSTETRICS AND GYNECOLOGY | Age: 59
End: 2024-03-25
Payer: COMMERCIAL

## 2024-03-25 LAB
25(OH)D3+25(OH)D2 SERPL-MCNC: 40.3 NG/ML (ref 30–100)
DHEA-S SERPL-MCNC: 9.8 UG/DL (ref 29.4–220.5)
ESTRADIOL SERPL-MCNC: <5 PG/ML
FERRITIN SERPL-MCNC: 128 NG/ML (ref 15–150)
FT4I SERPL CALC-MCNC: 2.1 (ref 1.2–4.9)
GLUCOSE SERPL-MCNC: 73 MG/DL (ref 70–99)
HBA1C MFR BLD: 5.7 % (ref 4.8–5.6)
INSULIN SERPL-ACNC: 6.6 UIU/ML (ref 2.6–24.9)
PROGEST SERPL-MCNC: 0.1 NG/ML
T3RU NFR SERPL: 26 % (ref 24–39)
T4 SERPL-MCNC: 8.2 UG/DL (ref 4.5–12)
TESTOST FREE SERPL-MCNC: 4.3 PG/ML (ref 0–4.2)
TESTOST SERPL-MCNC: 616 NG/DL (ref 4–50)
TSH SERPL DL<=0.005 MIU/L-ACNC: 1.57 UIU/ML (ref 0.45–4.5)

## 2024-04-24 ENCOUNTER — OFFICE VISIT (OUTPATIENT)
Dept: OBSTETRICS AND GYNECOLOGY | Age: 59
End: 2024-04-24
Payer: COMMERCIAL

## 2024-04-24 VITALS
WEIGHT: 136 LBS | BODY MASS INDEX: 24.1 KG/M2 | SYSTOLIC BLOOD PRESSURE: 124 MMHG | HEIGHT: 63 IN | DIASTOLIC BLOOD PRESSURE: 76 MMHG

## 2024-04-24 DIAGNOSIS — Z12.9 CANCER SCREENING: ICD-10-CM

## 2024-04-24 DIAGNOSIS — Z01.419 WELL WOMAN EXAM WITH ROUTINE GYNECOLOGICAL EXAM: Primary | ICD-10-CM

## 2024-04-24 DIAGNOSIS — Z12.31 BREAST CANCER SCREENING BY MAMMOGRAM: ICD-10-CM

## 2024-04-24 RX ORDER — VALACYCLOVIR HYDROCHLORIDE 1 G/1
2000 TABLET, FILM COATED ORAL DAILY
Qty: 30 TABLET | Refills: 3 | Status: SHIPPED | OUTPATIENT
Start: 2024-04-24

## 2024-04-24 NOTE — PROGRESS NOTES
Subjective     Chief Complaint   Patient presents with    Annual Exam     Annual exam last pap 11/14/2022 neg/neg, m/g 12/06/2022 (needs to schedule) , colonoscopy 2024, (rx refills of valtrex sent to [facility])       History of Present Illness    Yu Moise is a 58 y.o. No obstetric history on file. who presents for annual exam.    She is doing well    Is considering the genetic testing  Does not know her family history so she would like to get the testing done  Aware it would be cash pay    Did have a full w/u of cardiac health last year  Everything came back normal  Was having palpitations, likely hormone related    She did start HT through compounding pharmacy  Is feeling better   I refilled recently    Would like refill of valtrex, uses prn cold sore    Mammogram due  Will scheduled  CSC done recently  Pap utd    Obstetric History:  OB History    No obstetric history on file.        Menstrual History:     No LMP recorded. Patient is postmenopausal.         Current contraception: post menopausal status  History of abnormal Pap smear: no  Received Gardasil immunization: no  Perform regular self breast exam: yes - occl  Family history of uterine or ovarian cancer: no  Family History of colon cancer: no  Family history of breast cancer: no    Mammogram: ordered.  Colonoscopy: up to date.  DEXA: ordered.    Exercise: moderately active  Calcium/Vitamin D: adequate intake    The following portions of the patient's history were reviewed and updated as appropriate: allergies, current medications, past family history, past medical history, past social history, past surgical history, and problem list.    Review of Systems   All other systems reviewed and are negative.      Review of Systems   Constitutional: Negative for fatigue.   Respiratory: Negative for shortness of breath.    Gastrointestinal: Negative for abdominal pain.   Genitourinary: Negative for dysuria.   Neurological: Negative for headaches.  "  Psychiatric/Behavioral: Negative for dysphoric mood.         Objective   Physical Exam    /76   Ht 160 cm (63\")   Wt 61.7 kg (136 lb)   BMI 24.09 kg/m²   General:   alert, comfortable, and no distress   Heart: regular rate and rhythm   Lungs: clear to auscultation bilaterally   Breast: normal appearance, no masses or tenderness, Inspection negative, No nipple retraction or dimpling, No nipple discharge or bleeding, No axillary or supraclavicular adenopathy, Normal to palpation without dominant masses   Neck: no adenopathy and no carotid bruit   Abdomen: normal findings: soft, non-tender   CVA: Not performed today   Pelvis: External genitalia: normal general appearance  Vaginal: normal mucosa without prolapse or lesions  Cervix: normal appearance  Adnexa: normal bimanual exam  Uterus: normal single, nontender   Extremities: Not performed today   Neurologic: negative   Psychiatric: Normal affect, judgement, and mood     Assessment & Plan   Diagnoses and all orders for this visit:    1. Well woman exam with routine gynecological exam (Primary)    2. Breast cancer screening by mammogram  -     Mammo Screening Digital Tomosynthesis Bilateral With CAD    3. Cancer screening  -     [employer] [facility] Hereditary Cancer Screen    Other orders  -     valACYclovir (VALTREX) 1000 MG tablet; Take 2 tablets by mouth Daily.  Dispense: 30 tablet; Refill: 3        All questions answered.  Breast self exam technique reviewed and patient encouraged to perform self-exam monthly.  Discussed healthy lifestyle modifications.  Recommended 30 minutes of aerobic exercise five times per week.  Discussed calcium needs to prevent osteoporosis.      Pap utd  Mammogram ordered  Valtrex refilled  Genetic testing done             "

## 2024-05-29 DIAGNOSIS — Z15.89 BIALLELIC MUTATION OF SDHA GENE: Primary | ICD-10-CM

## 2024-06-13 ENCOUNTER — CLINICAL SUPPORT (OUTPATIENT)
Dept: GENETICS | Facility: HOSPITAL | Age: 59
End: 2024-06-13
Payer: COMMERCIAL

## 2024-06-13 DIAGNOSIS — Z80.3 FAMILY HISTORY OF BREAST CANCER: ICD-10-CM

## 2024-06-13 DIAGNOSIS — Z15.89 MONOALLELIC MUTATION OF SDHA GENE: ICD-10-CM

## 2024-06-13 DIAGNOSIS — Z13.79 GENETIC TESTING: Primary | ICD-10-CM

## 2024-06-13 PROCEDURE — 96040: CPT | Performed by: GENETIC COUNSELOR, MS

## 2024-06-13 NOTE — PROGRESS NOTES
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 Ms. Moise has no personal history of cancer. She is pre-post-menopausal and retains her uterus and ovaries. Ms. Moise's most recent mammogram was in 2022 and showed almost entirely fatty breast tissue but was otherwise normal. She had a colonoscopy this year and reports a history of 6-9 polyps. Due to an unknown family history, genetic testing was ordered via [employer] [facility] in April. 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 Ms. Moise was interested in discussing the risks related to SDHA and the management guidelines.     FAMILY HISTORY: (See attached pedigree)  Mother:  Breast cancer, 54  Mat Half-Aunt:  Lung cancer, 81    Ms. Moise has no information regarding her paternal history. Records regarding the family history were not available for review.     GENETIC COUNSELING (30 minutes): We reviewed the family history information in detail. Cases of breast cancer follow three general patterns: sporadic, familial, and hereditary. While most breast cancer is sporadic, some cases appear to occur in family clusters. These cases are said to be familial and account for 10-20% of breast cancer cases. Familial cases may be due to a combination of shared genes and environmental factors among family members. In even fewer families, the cancer is said to be inherited, and the genes responsible for the cancer are known.      Family histories typical of hereditary cancer syndromes usually include multiple first- and second-degree relatives diagnosed with cancer types that define a syndrome. These cases tend to be diagnosed at younger-than-expected ages and can be bilateral or multifocal. The cancer in these families follows an autosomal dominant inheritance pattern, which indicates the likely presence of a mutation in a cancer susceptibility gene. Children and  siblings of an individual believed to carry this mutation have a 50% chance of inheriting that mutation, thereby inheriting the increased risk to develop cancer. These mutations can be passed down from the maternal or the paternal lineage.    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  SDHA is associated with autosomal dominant hereditary paraganglioma-pheochromocytoma (PGL/PCC) syndrome. This is an inherited condition characterized by the growth of tumors in structures called paraganglia. Paraganglia are groups of cells that are found near nerve cell bunches called ganglia. A tumor involving the paraganglia is known as a paraganglioma. A type of paraganglioma known as a pheochromocytoma develops in the adrenal glands, which are located on top of each kidney and produce hormones in response to stress. Other types of paraganglioma are usually found in the head, neck, or trunk. People with hereditary PGL/PCC may develop one or more paragangliomas, which may include pheochromocytomas. The penetrance for paragangliomas/pheochromocytomas with SDHA is estimated to be approximately 10% in non-probands, with some reports of risks lower than that. Paragangliomas seen with SDHA are reported to have low risk of malignancy. There are reports of gastrointestinal stromal tumors (GIST) with SDHA. There is also preliminary evidence of a correlation with SDHA and an increased risk for renal cancer.    Ms. Moise's children each have a 50% chance of having inherited this SDHA mutation. Single site testing is available to relatives. Relatives would need to have a copy of Ms. Moise's test report in order to be tested for the specific mutation. Testing is available to individuals who are 6 years of age or older. We would be happy to see adult family members who live in the area in our clinic to further discuss this information and testing options. Relatives can call our office at  [phone] for assistance in scheduling an appointment, however a physician referral to our office will prompt our coordinator to contact patients to schedule an appointment. For family members who live elsewhere, there are genetic counselors at most major medical centers. They can find a genetic counselor by visiting the National Society of Genetic Counselors website at www.nsgc.org or they can call our office and we would be happy to give them the contact information of the closest genetic counselor.     SCREENING CONSIDERATIONS FOR HEREDITARY PGL/PCC:  NCCN guidelines outline screening recommendations for PGL/PCC syndrome including annual measurement of plasma-free metanephrines to detect active catecholamine-secreting tumors or 24-hour urine for fractionated metanephrines is often recommended. The evaluation for GISTs may be considered in individuals who have unexplained gastrointestinal symptoms or who have intestinal obstruction or anemia. Cross-sectional imaging of skull base to pelvis is recommended every 2-3 years. The underlying genetic alteration and associated penetrance is taken into consideration when making decisions regarding the frequency and intensity of screening. Per NCCN guidelines, since SDH genes have variability in their tumor penetrance and risk for malignancy, consideration can be given to modified screening intervals, especially for less penetrant genes such as SDHA. The penetrance in non-probands is lower with SDHA than other SDH genes.      At this time, Ms. Moise's breast cancer management should be guided by a family history-based risk assessment. Tyrer-Narinderck, version 8 is able to take into account personal factors (age at menarche, age at first live birth, breast density, etc) and family history when calculating risk for breast cancer. Computer modeling estimates that Ms. Moise's lifetime personal risk for developing breast cancer is up to 10.9% (Nayeli, v8),  compared to the average 59-year-old female's risk of 7.5%. In general, a lifetime risk above 20% is considered to be “high risk” where increased screening is warranted; Ms. Ruano risk does not fall into that category. This risk assessment is based on the family history information provided at the time of the appointment and could change in the future should new information be obtained.    Options available to individuals with an elevated lifetime risk for breast and/or ovarian cancer were briefly discussed. Based on computer modeling, Ms. Ruano lifetime risk for breast cancer would not be considered “high risk” (>20%). Per NCCN guidelines, it is appropriate for her to follow general population screening guidelines for her breast cancer risk including annual clinical breast exam and annual mammography. These assessments are based on the information provided at the time of consultation.     PLAN: Genetic counseling remains available to Ms. Moise and her family. We discussed referring her to a specialist in [place] to help manage her screening for PGL/PCC. She would like to be seen there so we will place that referral. Ms. Moise is welcome to contact me with any additional questions or concerns at [phone].      Lorena Fierro MS, [facility] [place], [facility]  Licensed Certified Genetic Counselor    Cc: Yu Mckeon” Moise   RICHMOND Johnson

## 2024-07-15 DIAGNOSIS — N95.1 MENOPAUSAL SYMPTOMS: ICD-10-CM

## 2024-07-15 DIAGNOSIS — F41.9 ANXIETY: Primary | ICD-10-CM

## 2024-07-29 ENCOUNTER — TELEPHONE (OUTPATIENT)
Dept: OBSTETRICS AND GYNECOLOGY | Age: 59
End: 2024-07-29
Payer: COMMERCIAL

## 2024-07-29 NOTE — TELEPHONE ENCOUNTER
"I wonder if referring to oncology ([facility]) is better?    Her genetic consult note says \"We discussed referring her to a specialist in [place] to help manage her screening for PGL/PCC. \"  So does not seem specifically breast related but I'm not sure (Hereditary Paraganglioma-Pheochromocytoma).   We could also message Khadijah Vadim  to ask who they would refer her to in [place].  "

## 2024-07-29 NOTE — TELEPHONE ENCOUNTER
"----- Message from Maame Malloy sent at 7/29/2024  9:25 AM EDT -----  Regarding: FW: Genetic Specialist  Contact: [phone]  It looks like Dae Frederick is a surgical oncologist? I could just refer her to [facility] or [facility], right? Or is there someone else in [facility] locally that would be better?  ----- Message -----  From: Jojo Ingram MA  Sent: 7/25/2024   1:06 PM EDT  To: RICHMOND Gleason  Subject: FW: Genetic Specialist                             ----- Message -----  From: Yu Moise \"Hope\"  Sent: 7/25/2024  12:45 PM EDT  To: [facility] Clinical Pool  Subject: Genetic Specialist                               After meeting with the  at [facility] a few weeks ago, she referred me to a surgeon for a follow up and next steps.  The name of the physician is Dr. Dae Frederick in [place].  Is it possible to see a doctor that is here in [place] for this?      I have already moved the appt once and need to move again because it means an entire day off work.    Thanks for your help,  Hope Suma  "

## 2024-07-30 DIAGNOSIS — Z15.89 BIALLELIC MUTATION OF SDHA GENE: Primary | ICD-10-CM

## 2024-07-30 NOTE — TELEPHONE ENCOUNTER
Please refer to Dr. Kyle at [facility]    Msg back from Khadijah Fierro GC:  She might be able to see someone here. We had recommended Dr. Frederick because he was familiar with those with mutation in the SDHA gene like she has. I believe Dr. Kyle has seen some other patients I've seen with a similar condition if you would like to send to the [facility] group

## 2024-08-05 LAB
25(OH)D3+25(OH)D2 SERPL-MCNC: 52.9 NG/ML (ref 30–100)
DHEA-S SERPL-MCNC: 70.7 UG/DL (ref 29.4–220.5)
ESTRADIOL SERPL-MCNC: <5 PG/ML
FERRITIN SERPL-MCNC: 50 NG/ML (ref 15–150)
FT4I SERPL CALC-MCNC: 1.6 (ref 1.2–4.9)
HBA1C MFR BLD: 5.5 % (ref 4.8–5.6)
INSULIN SERPL-ACNC: 23.4 UIU/ML (ref 2.6–24.9)
PROGEST SERPL-MCNC: 3.5 NG/ML
T3RU NFR SERPL: 25 % (ref 24–39)
T4 SERPL-MCNC: 6.3 UG/DL (ref 4.5–12)
TESTOST FREE SERPL-MCNC: <0.2 PG/ML (ref 0–4.2)
TESTOST SERPL-MCNC: <3 NG/DL (ref 4–50)
TSH SERPL DL<=0.005 MIU/L-ACNC: 0.83 UIU/ML (ref 0.45–4.5)

## 2024-08-06 ENCOUNTER — TELEPHONE (OUTPATIENT)
Dept: OBSTETRICS AND GYNECOLOGY | Age: 59
End: 2024-08-06
Payer: COMMERCIAL

## 2024-08-06 NOTE — TELEPHONE ENCOUNTER
Provider: RICHMOND Forbes    Caller: JOSE LUIS FaithHOPE)    Relationship to Patient: SELF    Pharmacy: [facility] [address] [address], [facility] [place] [address] - [phone] PH - [phone]     Phone Number: [phone]    Reason for Call: LABS ONLY    When was the patient last seen: 04.24.24    PATIENT WOULD LIKE THE LABS THAT WAS DONE ON 08.01.24 TO BE SENT TO [facility]   THEIR FAX NUMBER [phone] IS LISTED ABOVE ALONG WITH THEIR PHONE NUMBER [phone]    PATIENT CAN BE REACHED [phone]    THANK YOU

## 2024-10-25 DIAGNOSIS — N95.1 MENOPAUSAL SYMPTOMS: Primary | ICD-10-CM

## 2024-10-28 RX ORDER — TESTOSTERONE MICRONIZED 100 %
POWDER (GRAM) MISCELLANEOUS
OUTPATIENT
Start: 2024-10-28

## 2024-11-27 ENCOUNTER — TELEPHONE (OUTPATIENT)
Dept: OBSTETRICS AND GYNECOLOGY | Age: 59
End: 2024-11-27
Payer: COMMERCIAL

## 2024-12-04 ENCOUNTER — APPOINTMENT (OUTPATIENT)
Dept: GENERAL RADIOLOGY | Facility: HOSPITAL | Age: 59
End: 2024-12-04
Payer: COMMERCIAL

## 2024-12-04 ENCOUNTER — APPOINTMENT (OUTPATIENT)
Dept: MRI IMAGING | Facility: HOSPITAL | Age: 59
End: 2024-12-04
Payer: COMMERCIAL

## 2024-12-04 ENCOUNTER — APPOINTMENT (OUTPATIENT)
Dept: CT IMAGING | Facility: HOSPITAL | Age: 59
End: 2024-12-04
Payer: COMMERCIAL

## 2024-12-04 ENCOUNTER — APPOINTMENT (OUTPATIENT)
Dept: CARDIOLOGY | Facility: HOSPITAL | Age: 59
End: 2024-12-04
Payer: COMMERCIAL

## 2024-12-04 ENCOUNTER — HOSPITAL ENCOUNTER (OUTPATIENT)
Facility: HOSPITAL | Age: 59
Setting detail: OBSERVATION
Discharge: HOME | End: 2024-12-05
Attending: EMERGENCY MEDICINE | Admitting: EMERGENCY MEDICINE
Payer: COMMERCIAL

## 2024-12-04 DIAGNOSIS — M25.552 ACUTE HIP PAIN, LEFT: ICD-10-CM

## 2024-12-04 DIAGNOSIS — M54.32 SCIATICA OF LEFT SIDE: Primary | ICD-10-CM

## 2024-12-04 DIAGNOSIS — R93.7 ABNORMAL CT SCAN, LUMBAR SPINE: ICD-10-CM

## 2024-12-04 DIAGNOSIS — M51.369 DEGENERATION OF INTERVERTEBRAL DISC OF LUMBAR REGION, UNSPECIFIED WHETHER PAIN PRESENT: ICD-10-CM

## 2024-12-04 PROBLEM — M25.559 HIP PAIN: Status: ACTIVE | Noted: 2024-12-04

## 2024-12-04 LAB
ALBUMIN SERPL-MCNC: 3.8 G/DL (ref 3.5–5.2)
ALBUMIN/GLOB SERPL: 1.3 G/DL
ALP SERPL-CCNC: 43 U/L (ref 39–117)
ALT SERPL W P-5'-P-CCNC: 25 U/L (ref 1–33)
ANION GAP SERPL CALCULATED.3IONS-SCNC: 10.9 MMOL/L (ref 5–15)
AST SERPL-CCNC: 21 U/L (ref 1–32)
BASOPHILS # BLD AUTO: 0.03 10*3/MM3 (ref 0–0.2)
BASOPHILS NFR BLD AUTO: 0.4 % (ref 0–1.5)
BH CV LOWER VASCULAR LEFT COMMON FEMORAL AUGMENT: NORMAL
BH CV LOWER VASCULAR LEFT COMMON FEMORAL COMPETENT: NORMAL
BH CV LOWER VASCULAR LEFT COMMON FEMORAL COMPRESS: NORMAL
BH CV LOWER VASCULAR LEFT COMMON FEMORAL PHASIC: NORMAL
BH CV LOWER VASCULAR LEFT COMMON FEMORAL SPONT: NORMAL
BH CV LOWER VASCULAR LEFT DISTAL FEMORAL COMPRESS: NORMAL
BH CV LOWER VASCULAR LEFT GASTRONEMIUS COMPRESS: NORMAL
BH CV LOWER VASCULAR LEFT GREATER SAPH AK COMPRESS: NORMAL
BH CV LOWER VASCULAR LEFT GREATER SAPH BK COMPRESS: NORMAL
BH CV LOWER VASCULAR LEFT LESSER SAPH COMPRESS: NORMAL
BH CV LOWER VASCULAR LEFT MID FEMORAL AUGMENT: NORMAL
BH CV LOWER VASCULAR LEFT MID FEMORAL COMPETENT: NORMAL
BH CV LOWER VASCULAR LEFT MID FEMORAL COMPRESS: NORMAL
BH CV LOWER VASCULAR LEFT MID FEMORAL PHASIC: NORMAL
BH CV LOWER VASCULAR LEFT MID FEMORAL SPONT: NORMAL
BH CV LOWER VASCULAR LEFT PERONEAL COMPRESS: NORMAL
BH CV LOWER VASCULAR LEFT POPLITEAL AUGMENT: NORMAL
BH CV LOWER VASCULAR LEFT POPLITEAL COMPETENT: NORMAL
BH CV LOWER VASCULAR LEFT POPLITEAL COMPRESS: NORMAL
BH CV LOWER VASCULAR LEFT POPLITEAL PHASIC: NORMAL
BH CV LOWER VASCULAR LEFT POPLITEAL SPONT: NORMAL
BH CV LOWER VASCULAR LEFT POSTERIOR TIBIAL COMPRESS: NORMAL
BH CV LOWER VASCULAR LEFT PROFUNDA FEMORAL COMPRESS: NORMAL
BH CV LOWER VASCULAR LEFT PROXIMAL FEMORAL COMPRESS: NORMAL
BH CV LOWER VASCULAR LEFT SAPHENOFEMORAL JUNCTION COMPRESS: NORMAL
BH CV LOWER VASCULAR RIGHT COMMON FEMORAL AUGMENT: NORMAL
BH CV LOWER VASCULAR RIGHT COMMON FEMORAL COMPETENT: NORMAL
BH CV LOWER VASCULAR RIGHT COMMON FEMORAL COMPRESS: NORMAL
BH CV LOWER VASCULAR RIGHT COMMON FEMORAL PHASIC: NORMAL
BH CV LOWER VASCULAR RIGHT COMMON FEMORAL SPONT: NORMAL
BH CV VAS PRELIMINARY FINDINGS SCRIPTING: 1
BILIRUB SERPL-MCNC: 0.4 MG/DL (ref 0–1.2)
BUN SERPL-MCNC: 13 MG/DL (ref 6–20)
BUN/CREAT SERPL: 17.1 (ref 7–25)
CALCIUM SPEC-SCNC: 9.2 MG/DL (ref 8.6–10.5)
CHLORIDE SERPL-SCNC: 104 MMOL/L (ref 98–107)
CO2 SERPL-SCNC: 24.1 MMOL/L (ref 22–29)
CREAT SERPL-MCNC: 0.76 MG/DL (ref 0.57–1)
DEPRECATED RDW RBC AUTO: 43.5 FL (ref 37–54)
EGFRCR SERPLBLD CKD-EPI 2021: 90.4 ML/MIN/1.73
EOSINOPHIL # BLD AUTO: 0.13 10*3/MM3 (ref 0–0.4)
EOSINOPHIL NFR BLD AUTO: 1.9 % (ref 0.3–6.2)
ERYTHROCYTE [DISTWIDTH] IN BLOOD BY AUTOMATED COUNT: 12.4 % (ref 12.3–15.4)
GLOBULIN UR ELPH-MCNC: 3 GM/DL
GLUCOSE SERPL-MCNC: 88 MG/DL (ref 65–99)
HCT VFR BLD AUTO: 41 % (ref 34–46.6)
HGB BLD-MCNC: 14 G/DL (ref 12–15.9)
IMM GRANULOCYTES # BLD AUTO: 0.03 10*3/MM3 (ref 0–0.05)
IMM GRANULOCYTES NFR BLD AUTO: 0.4 % (ref 0–0.5)
LYMPHOCYTES # BLD AUTO: 2.26 10*3/MM3 (ref 0.7–3.1)
LYMPHOCYTES NFR BLD AUTO: 33.6 % (ref 19.6–45.3)
MCH RBC QN AUTO: 32.9 PG (ref 26.6–33)
MCHC RBC AUTO-ENTMCNC: 34.1 G/DL (ref 31.5–35.7)
MCV RBC AUTO: 96.5 FL (ref 79–97)
MONOCYTES # BLD AUTO: 0.53 10*3/MM3 (ref 0.1–0.9)
MONOCYTES NFR BLD AUTO: 7.9 % (ref 5–12)
NEUTROPHILS NFR BLD AUTO: 3.74 10*3/MM3 (ref 1.7–7)
NEUTROPHILS NFR BLD AUTO: 55.8 % (ref 42.7–76)
NRBC BLD AUTO-RTO: 0 /100 WBC (ref 0–0.2)
PLATELET # BLD AUTO: 282 10*3/MM3 (ref 140–450)
PMV BLD AUTO: 8.8 FL (ref 6–12)
POTASSIUM SERPL-SCNC: 3.6 MMOL/L (ref 3.5–5.2)
PROT SERPL-MCNC: 6.8 G/DL (ref 6–8.5)
RBC # BLD AUTO: 4.25 10*6/MM3 (ref 3.77–5.28)
SODIUM SERPL-SCNC: 139 MMOL/L (ref 136–145)
WBC NRBC COR # BLD AUTO: 6.72 10*3/MM3 (ref 3.4–10.8)

## 2024-12-04 PROCEDURE — 73502 X-RAY EXAM HIP UNI 2-3 VIEWS: CPT

## 2024-12-04 PROCEDURE — G0378 HOSPITAL OBSERVATION PER HR: HCPCS

## 2024-12-04 PROCEDURE — 80053 COMPREHEN METABOLIC PANEL: CPT | Performed by: NURSE PRACTITIONER

## 2024-12-04 PROCEDURE — 25010000002 ONDANSETRON PER 1 MG: Performed by: NURSE PRACTITIONER

## 2024-12-04 PROCEDURE — 25010000002 DEXAMETHASONE PER 1 MG

## 2024-12-04 PROCEDURE — 25010000002 MORPHINE PER 10 MG: Performed by: NURSE PRACTITIONER

## 2024-12-04 PROCEDURE — 96375 TX/PRO/DX INJ NEW DRUG ADDON: CPT

## 2024-12-04 PROCEDURE — 85025 COMPLETE CBC W/AUTO DIFF WBC: CPT | Performed by: NURSE PRACTITIONER

## 2024-12-04 PROCEDURE — 72146 MRI CHEST SPINE W/O DYE: CPT

## 2024-12-04 PROCEDURE — 72131 CT LUMBAR SPINE W/O DYE: CPT

## 2024-12-04 PROCEDURE — 72148 MRI LUMBAR SPINE W/O DYE: CPT

## 2024-12-04 PROCEDURE — 93971 EXTREMITY STUDY: CPT | Performed by: SURGERY

## 2024-12-04 PROCEDURE — 63710000001 PREDNISONE PER 1 MG: Performed by: NURSE PRACTITIONER

## 2024-12-04 PROCEDURE — 93971 EXTREMITY STUDY: CPT

## 2024-12-04 PROCEDURE — 25010000002 KETOROLAC TROMETHAMINE PER 15 MG: Performed by: NURSE PRACTITIONER

## 2024-12-04 PROCEDURE — 99285 EMERGENCY DEPT VISIT HI MDM: CPT

## 2024-12-04 PROCEDURE — 96374 THER/PROPH/DIAG INJ IV PUSH: CPT

## 2024-12-04 RX ORDER — ONDANSETRON 2 MG/ML
4 INJECTION INTRAMUSCULAR; INTRAVENOUS EVERY 6 HOURS PRN
Status: DISCONTINUED | OUTPATIENT
Start: 2024-12-04 | End: 2024-12-05 | Stop reason: HOSPADM

## 2024-12-04 RX ORDER — HYDROMORPHONE HYDROCHLORIDE 1 MG/ML
0.25 INJECTION, SOLUTION INTRAMUSCULAR; INTRAVENOUS; SUBCUTANEOUS EVERY 4 HOURS PRN
Status: DISCONTINUED | OUTPATIENT
Start: 2024-12-04 | End: 2024-12-05 | Stop reason: HOSPADM

## 2024-12-04 RX ORDER — MULTIVITAMIN
1 CAPSULE ORAL DAILY
COMMUNITY

## 2024-12-04 RX ORDER — BISACODYL 5 MG/1
5 TABLET, DELAYED RELEASE ORAL DAILY PRN
Status: DISCONTINUED | OUTPATIENT
Start: 2024-12-04 | End: 2024-12-05 | Stop reason: HOSPADM

## 2024-12-04 RX ORDER — POLYETHYLENE GLYCOL 3350 17 G/17G
17 POWDER, FOR SOLUTION ORAL DAILY PRN
Status: DISCONTINUED | OUTPATIENT
Start: 2024-12-04 | End: 2024-12-05 | Stop reason: HOSPADM

## 2024-12-04 RX ORDER — PREDNISONE 20 MG/1
60 TABLET ORAL ONCE
Status: COMPLETED | OUTPATIENT
Start: 2024-12-04 | End: 2024-12-04

## 2024-12-04 RX ORDER — AMOXICILLIN 250 MG
2 CAPSULE ORAL 2 TIMES DAILY PRN
Status: DISCONTINUED | OUTPATIENT
Start: 2024-12-04 | End: 2024-12-05 | Stop reason: HOSPADM

## 2024-12-04 RX ORDER — SERTRALINE HYDROCHLORIDE 25 MG/1
25 TABLET, FILM COATED ORAL NIGHTLY
COMMUNITY

## 2024-12-04 RX ORDER — BISACODYL 10 MG
10 SUPPOSITORY, RECTAL RECTAL DAILY PRN
Status: DISCONTINUED | OUTPATIENT
Start: 2024-12-04 | End: 2024-12-05 | Stop reason: HOSPADM

## 2024-12-04 RX ORDER — METHOCARBAMOL 750 MG/1
750 TABLET, FILM COATED ORAL ONCE
Status: COMPLETED | OUTPATIENT
Start: 2024-12-04 | End: 2024-12-04

## 2024-12-04 RX ORDER — KETOROLAC TROMETHAMINE 15 MG/ML
15 INJECTION, SOLUTION INTRAMUSCULAR; INTRAVENOUS ONCE
Status: COMPLETED | OUTPATIENT
Start: 2024-12-04 | End: 2024-12-04

## 2024-12-04 RX ORDER — HYDROCHLOROTHIAZIDE 25 MG/1
25 TABLET ORAL EVERY MORNING
Status: DISCONTINUED | OUTPATIENT
Start: 2024-12-05 | End: 2024-12-05 | Stop reason: HOSPADM

## 2024-12-04 RX ORDER — MORPHINE SULFATE 2 MG/ML
4 INJECTION, SOLUTION INTRAMUSCULAR; INTRAVENOUS ONCE
Status: COMPLETED | OUTPATIENT
Start: 2024-12-04 | End: 2024-12-04

## 2024-12-04 RX ORDER — ACETAMINOPHEN 650 MG/1
650 SUPPOSITORY RECTAL EVERY 4 HOURS PRN
Status: DISCONTINUED | OUTPATIENT
Start: 2024-12-04 | End: 2024-12-05 | Stop reason: HOSPADM

## 2024-12-04 RX ORDER — ACETAMINOPHEN 160 MG/5ML
650 SOLUTION ORAL EVERY 4 HOURS PRN
Status: DISCONTINUED | OUTPATIENT
Start: 2024-12-04 | End: 2024-12-05 | Stop reason: HOSPADM

## 2024-12-04 RX ORDER — ACETAMINOPHEN 325 MG/1
650 TABLET ORAL EVERY 4 HOURS PRN
Status: DISCONTINUED | OUTPATIENT
Start: 2024-12-04 | End: 2024-12-05 | Stop reason: HOSPADM

## 2024-12-04 RX ORDER — OXYCODONE AND ACETAMINOPHEN 5; 325 MG/1; MG/1
1 TABLET ORAL EVERY 6 HOURS PRN
Status: DISCONTINUED | OUTPATIENT
Start: 2024-12-04 | End: 2024-12-05

## 2024-12-04 RX ORDER — DEXAMETHASONE SODIUM PHOSPHATE 10 MG/ML
8 INJECTION INTRAMUSCULAR; INTRAVENOUS ONCE
Status: COMPLETED | OUTPATIENT
Start: 2024-12-04 | End: 2024-12-04

## 2024-12-04 RX ORDER — SODIUM CHLORIDE 9 MG/ML
40 INJECTION, SOLUTION INTRAVENOUS AS NEEDED
Status: DISCONTINUED | OUTPATIENT
Start: 2024-12-04 | End: 2024-12-05 | Stop reason: HOSPADM

## 2024-12-04 RX ORDER — METHOCARBAMOL 750 MG/1
750 TABLET, FILM COATED ORAL EVERY 6 HOURS SCHEDULED
Status: DISCONTINUED | OUTPATIENT
Start: 2024-12-04 | End: 2024-12-05 | Stop reason: HOSPADM

## 2024-12-04 RX ORDER — LIDOCAINE 4 G/G
1 PATCH TOPICAL ONCE
Status: COMPLETED | OUTPATIENT
Start: 2024-12-04 | End: 2024-12-05

## 2024-12-04 RX ORDER — SODIUM CHLORIDE 0.9 % (FLUSH) 0.9 %
10 SYRINGE (ML) INJECTION AS NEEDED
Status: DISCONTINUED | OUTPATIENT
Start: 2024-12-04 | End: 2024-12-05 | Stop reason: HOSPADM

## 2024-12-04 RX ORDER — DEXAMETHASONE SODIUM PHOSPHATE 4 MG/ML
4 INJECTION, SOLUTION INTRA-ARTICULAR; INTRALESIONAL; INTRAMUSCULAR; INTRAVENOUS; SOFT TISSUE EVERY 8 HOURS
Status: DISCONTINUED | OUTPATIENT
Start: 2024-12-05 | End: 2024-12-05 | Stop reason: HOSPADM

## 2024-12-04 RX ORDER — ONDANSETRON 2 MG/ML
4 INJECTION INTRAMUSCULAR; INTRAVENOUS ONCE
Status: COMPLETED | OUTPATIENT
Start: 2024-12-04 | End: 2024-12-04

## 2024-12-04 RX ORDER — SODIUM CHLORIDE 0.9 % (FLUSH) 0.9 %
10 SYRINGE (ML) INJECTION EVERY 12 HOURS SCHEDULED
Status: DISCONTINUED | OUTPATIENT
Start: 2024-12-04 | End: 2024-12-05 | Stop reason: HOSPADM

## 2024-12-04 RX ORDER — HYDROCODONE BITARTRATE AND ACETAMINOPHEN 5; 325 MG/1; MG/1
1 TABLET ORAL ONCE
Status: COMPLETED | OUTPATIENT
Start: 2024-12-04 | End: 2024-12-04

## 2024-12-04 RX ORDER — ONDANSETRON 4 MG/1
4 TABLET, ORALLY DISINTEGRATING ORAL EVERY 6 HOURS PRN
Status: DISCONTINUED | OUTPATIENT
Start: 2024-12-04 | End: 2024-12-05 | Stop reason: HOSPADM

## 2024-12-04 RX ADMIN — HYDROCODONE BITARTRATE AND ACETAMINOPHEN 1 TABLET: 5; 325 TABLET ORAL at 17:53

## 2024-12-04 RX ADMIN — LIDOCAINE 1 PATCH: 4 PATCH TOPICAL at 16:19

## 2024-12-04 RX ADMIN — DEXAMETHASONE SODIUM PHOSPHATE 8 MG: 10 INJECTION INTRAMUSCULAR; INTRAVENOUS at 21:12

## 2024-12-04 RX ADMIN — SERTRALINE 25 MG: 50 TABLET, FILM COATED ORAL at 23:20

## 2024-12-04 RX ADMIN — PREDNISONE 60 MG: 20 TABLET ORAL at 16:20

## 2024-12-04 RX ADMIN — KETOROLAC TROMETHAMINE 15 MG: 15 INJECTION, SOLUTION INTRAMUSCULAR; INTRAVENOUS at 16:29

## 2024-12-04 RX ADMIN — METHOCARBAMOL TABLETS 750 MG: 750 TABLET, COATED ORAL at 21:12

## 2024-12-04 RX ADMIN — MORPHINE SULFATE 4 MG: 2 INJECTION, SOLUTION INTRAMUSCULAR; INTRAVENOUS at 17:54

## 2024-12-04 RX ADMIN — ONDANSETRON 4 MG: 2 INJECTION, SOLUTION INTRAMUSCULAR; INTRAVENOUS at 17:54

## 2024-12-04 RX ADMIN — Medication 10 ML: at 21:13

## 2024-12-04 RX ADMIN — METHOCARBAMOL 750 MG: 750 TABLET ORAL at 16:20

## 2024-12-04 NOTE — ED PROVIDER NOTES
EMERGENCY DEPARTMENT MD ATTESTATION NOTE    Room Number:  132/1  PCP: Sy Zaidi APRN  Independent Historians: Patient    HPI:  A complete HPI/ROS/PMH/PSH/SH/FH are unobtainable due to: None    Chronic or social conditions impacting patient care (Social Determinants of Health): None      Context: Yu Moise is a 58 y.o. female with a medical history of Ménière's disease, hypertension, depression and anxiety who presents to the ED c/o acute worsening pain in the left hip area that radiates into the left thigh and calf and foot over the past 2 weeks.  She denies any fevers.  Denies any bowel or bladder incontinence.  She has not had any recent accidents or injuries to explain this worsening pain.      Review of prior external notes (non-ED) -and- Review of prior external test results outside of this encounter: I reviewed the nephrology progress note from November 15, 2024.  She had assessment and plan for nephrocalcinosis.    Prescription drug monitoring program review: SHIV reviewed by Fidel Hernandez MD, David Tripp MD   N/A and SHIV query complete and reviewed. Patient receives regular prescriptions for controlled substances.      PHYSICAL EXAM    I have reviewed the triage vital signs and nursing notes.    ED Triage Vitals   Temp Heart Rate Resp BP SpO2   12/04/24 1513 12/04/24 1513 12/04/24 1513 12/04/24 1515 12/04/24 1513   98.9 °F (37.2 °C) 82 12 160/100 97 %      Temp src Heart Rate Source Patient Position BP Location FiO2 (%)   12/04/24 1513 12/04/24 1513 -- -- --   Tympanic Monitor          Physical Exam  GENERAL: alert, no acute distress  SKIN: Warm, dry  HENT: Normocephalic, atraumatic  EYES: no scleral icterus  CV: regular rhythm, regular rate  RESPIRATORY: normal effort, lungs clear  ABDOMEN: soft, nondistended, nontender  MUSCULOSKELETAL: no deformity or asymmetry of the lower extremities  NEURO: alert, moves all extremities, follows commands      MEDICATIONS GIVEN IN  ER  Medications   sodium chloride 0.9 % flush 10 mL (has no administration in time range)   Lidocaine 4 % 1 patch (1 patch Transdermal Medication Applied 12/4/24 1619)   sodium chloride 0.9 % flush 10 mL (10 mL Intravenous Given 12/4/24 2113)   sodium chloride 0.9 % flush 10 mL (has no administration in time range)   sodium chloride 0.9 % infusion 40 mL (has no administration in time range)   ondansetron ODT (ZOFRAN-ODT) disintegrating tablet 4 mg (has no administration in time range)     Or   ondansetron (ZOFRAN) injection 4 mg (has no administration in time range)   Potassium Replacement - Follow Nurse / BPA Driven Protocol (has no administration in time range)   Magnesium Standard Dose Replacement - Follow Nurse / BPA Driven Protocol (has no administration in time range)   Phosphorus Replacement - Follow Nurse / BPA Driven Protocol (has no administration in time range)   Calcium Replacement - Follow Nurse / BPA Driven Protocol (has no administration in time range)   acetaminophen (TYLENOL) tablet 650 mg (has no administration in time range)     Or   acetaminophen (TYLENOL) 160 MG/5ML oral solution 650 mg (has no administration in time range)     Or   acetaminophen (TYLENOL) suppository 650 mg (has no administration in time range)   methocarbamol (ROBAXIN) tablet 750 mg (750 mg Oral Given 12/4/24 2112)   sennosides-docusate (PERICOLACE) 8.6-50 MG per tablet 2 tablet (has no administration in time range)     And   polyethylene glycol (MIRALAX) packet 17 g (has no administration in time range)     And   bisacodyl (DULCOLAX) EC tablet 5 mg (has no administration in time range)     And   bisacodyl (DULCOLAX) suppository 10 mg (has no administration in time range)   dexAMETHasone sodium phosphate injection 4 mg (has no administration in time range)   oxyCODONE-acetaminophen (PERCOCET) 5-325 MG per tablet 1 tablet (has no administration in time range)   HYDROmorphone (DILAUDID) injection 0.25 mg (has no administration  in time range)   hydroCHLOROthiazide tablet 25 mg (has no administration in time range)   sertraline (ZOLOFT) tablet 25 mg (has no administration in time range)   predniSONE (DELTASONE) tablet 60 mg (60 mg Oral Given 12/4/24 1620)   ketorolac (TORADOL) injection 15 mg (15 mg Intravenous Given 12/4/24 1629)   methocarbamol (ROBAXIN) tablet 750 mg (750 mg Oral Given 12/4/24 1620)   morphine injection 4 mg (4 mg Intravenous Given 12/4/24 1754)   ondansetron (ZOFRAN) injection 4 mg (4 mg Intravenous Given 12/4/24 1754)   HYDROcodone-acetaminophen (NORCO) 5-325 MG per tablet 1 tablet (1 tablet Oral Given 12/4/24 1753)   dexAMETHasone (DECADRON) injection 8 mg (8 mg Intravenous Given 12/4/24 2112)         ORDERS PLACED DURING THIS VISIT:  Orders Placed This Encounter   Procedures    XR Hip With or Without Pelvis 2 - 3 View Left    CT Lumbar Spine Without Contrast    MRI Thoracic Spine Without Contrast    MRI Lumbar Spine Without Contrast    Comprehensive Metabolic Panel    CBC Auto Differential    CBC (No Diff)    Basic Metabolic Panel    NPO Diet NPO Type: Strict NPO    Diet: Cardiac; Healthy Heart (2-3 Na+); Fluid Consistency: Thin (IDDSI 0)    Ambulate patient    Intake & Output    Weigh Patient    Oral Care    Saline Lock & Maintain IV Access    Vital Signs    Up With Assistance    Code Status and Medical Interventions: CPR (Attempt to Resuscitate); Full Support    Inpatient Neurosurgery Consult    PT Consult: Eval & Treat Functional Mobility Below Baseline    Pulse Oximetry,  Spot    Insert Peripheral IV    Insert Peripheral IV    Initiate ED Observation Status    CBC & Differential         PROCEDURES  Procedures      PROGRESS, DATA ANALYSIS, CONSULTS, AND MEDICAL DECISION MAKING  All labs have been independently interpreted by me.  All radiology studies have been reviewed by me. All EKG's have been independently viewed and interpreted by me.  Discussion below represents my analysis of pertinent findings related to  patient's condition, differential diagnosis, treatment plan and final disposition.    Differential diagnosis includes but is not limited to degenerative disc disease, lumbar radiculopathy, cauda equina syndrome, DVT muscular strain.    Clinical Scores:                 MDM:  ED Course as of 12/04/24 2151   Wed Dec 04, 2024   1645 Independently reviewed the x-ray of the left hip and my interpretation is no obvious fracture or dislocation. [JG]   1646 Updated patient on x-ray imaging. [JG]   1710 Discussed with vascular tech, patient negative for DVT. No incidentals reported.  [JG]   1919 Having difficulty with ambulation, plan to admit for MRI and neurosurgery consult. [JG]   1923 Discussed with ART Manriquez who agrees to obs admission. No further recommendations.  [JG]      ED Course User Index  [JG] Destinee Ramirez APRN       I independently interpreted the x-ray of the left hip and pelvis and my findings are: No fracture or dislocation    I reviewed the labs from today's ED visit.  The hematologic studies and the complete metabolic profile are all normal.  We have ruled out DVT with a negative duplex venous ultrasound study patient does not seem to have any neurologic deficits and I do not suspect cauda equina syndrome based on her clinical features.  However patient is continuing to struggle with this pain and having difficulties bearing weight.  Therefore we will make arrangements to place her in the observation unit tonight for further workup and consultation with spine specialist.  She is agreeable with that plan.    COMPLEXITY OF CARE  The patient requires admission.    Please note that portions of this document were completed with a voice recognition program.    Note Disclaimer: At [facility], we believe that sharing information builds trust and better relationships. You are receiving this note because you recently visited [facility]. It is possible you will see health information before a  provider has talked with you about it. This kind of information can be easy to misunderstand. To help you fully understand what it means for your health, we urge you to discuss this note with your provider.       David Tripp MD  12/04/24 8673

## 2024-12-04 NOTE — ED PROVIDER NOTES
EMERGENCY DEPARTMENT ENCOUNTER    Room Number:  01/01  Date seen:  12/4/2024  PCP: Sy Zaidi APRN  Historian/Independent historian:   Chronic or social conditions impacting care: n/a      HPI:  Chief Complaint: left hip pain  A complete HPI/ROS/PMH/PSH/SH/FH are unobtainable due to: n/a  Context: Yu Moise is a 58 y.o. female who presents to the ED c/o acute left hip pain.  Patient states that she has been dealing with some chronic left hip pain.  She was going to physical therapy with her last appointment about 2 weeks ago and had been doing well.  She states that over the weekend she traveled 16 hours in the car to [place] and began having acute severe left hip pain.  She states that the pain is so severe that when it is flaring up she is unable to bear weight.  It radiates down her lateral thigh and calf into her foot.  She states that she is not having numbness in her left foot.  No weakness.  No loss of bowel or bladder control, saddle anesthesia.  She denies any calf swelling, redness, chest pain, shortness of breath, history of DVT/PE.  She has had no falls or trauma.  She has no back pain.  is at bedside.     External Medical record review:   12/3/2024: Urgent care visit for left-sided sciatica prescribed muscle relaxer, encouraged to take Tylenol and ibuprofen       PAST MEDICAL HISTORY  Active Ambulatory Problems     Diagnosis Date Noted    Weight gain finding 07/09/2019    Lack of libido 07/09/2019    Decline in verbal memory 07/09/2019     Resolved Ambulatory Problems     Diagnosis Date Noted    No Resolved Ambulatory Problems     Past Medical History:   Diagnosis Date    Anxiety     Depression     Gestational hypertension 1998    Hypertension     Meniere's disease     PMS (premenstrual syndrome)     Trauma     Urinary tract infection     Varicella          PAST SURGICAL HISTORY  Past Surgical History:   Procedure Laterality Date    ENDOMETRIAL ABLATION      CRUZ  TOOTH EXTRACTION           FAMILY HISTORY  Family History   Problem Relation Age of Onset    Breast cancer Mother          SOCIAL HISTORY  Social History     Socioeconomic History    Marital status:    Tobacco Use    Smoking status: Never     Passive exposure: Never    Smokeless tobacco: Never   Vaping Use    Vaping status: Never Used   Substance and Sexual Activity    Alcohol use: No    Drug use: No    Sexual activity: Yes     Partners: Male     Birth control/protection: Post-menopausal         ALLERGIES  Sulfa antibiotics        REVIEW OF SYSTEMS  Per HPI, otherwise negative.       PHYSICAL EXAM  ED Triage Vitals   Temp Heart Rate Resp BP SpO2   12/04/24 1513 12/04/24 1513 12/04/24 1513 12/04/24 1515 12/04/24 1513   98.9 °F (37.2 °C) 82 12 160/100 97 %      Temp src Heart Rate Source Patient Position BP Location FiO2 (%)   12/04/24 1513 12/04/24 1513 -- -- --   Tympanic Monitor          Physical Exam  Vitals and nursing note reviewed.   Constitutional:       Appearance: Normal appearance.   HENT:      Head: Normocephalic and atraumatic.      Mouth/Throat:      Mouth: Mucous membranes are moist.   Eyes:      Conjunctiva/sclera: Conjunctivae normal.   Cardiovascular:      Rate and Rhythm: Normal rate and regular rhythm.      Pulses: Normal pulses.      Heart sounds: Normal heart sounds.   Pulmonary:      Effort: Pulmonary effort is normal.      Breath sounds: Normal breath sounds. No wheezing.   Abdominal:      General: Bowel sounds are normal.      Palpations: Abdomen is soft.      Tenderness: There is no abdominal tenderness. There is no guarding.   Musculoskeletal:      Right lower leg: No edema.      Left lower leg: No edema.      Comments: No midline cervical spine tenderness. No obvious step-offs or deformities. Strength 5/5 equal to BUE. Sensation intact to light touch. FROM.    No midline thoracic spine tenderness. No obvious step-off or deformities.     No lumbar spine tenderness. Negative  straight-leg exam bilaterally. Strength 5/5 and equal to BLE. Sensation intact to light touch. FROM.    Mild, right anterior hip ttp. Pelvis stable.    Skin:     General: Skin is warm.      Capillary Refill: Capillary refill takes less than 2 seconds.   Neurological:      Mental Status: She is alert and oriented to person, place, and time. Mental status is at baseline.   Psychiatric:         Mood and Affect: Mood normal.               LAB RESULTS  Recent Results (from the past 24 hours)   Comprehensive Metabolic Panel    Collection Time: 12/04/24  4:31 PM    Specimen: Arm, Right; Blood   Result Value Ref Range    Glucose 88 65 - 99 mg/dL    BUN 13 6 - 20 mg/dL    Creatinine 0.76 0.57 - 1.00 mg/dL    Sodium 139 136 - 145 mmol/L    Potassium 3.6 3.5 - 5.2 mmol/L    Chloride 104 98 - 107 mmol/L    CO2 24.1 22.0 - 29.0 mmol/L    Calcium 9.2 8.6 - 10.5 mg/dL    Total Protein 6.8 6.0 - 8.5 g/dL    Albumin 3.8 3.5 - 5.2 g/dL    ALT (SGPT) 25 1 - 33 U/L    AST (SGOT) 21 1 - 32 U/L    Alkaline Phosphatase 43 39 - 117 U/L    Total Bilirubin 0.4 0.0 - 1.2 mg/dL    Globulin 3.0 gm/dL    A/G Ratio 1.3 g/dL    BUN/Creatinine Ratio 17.1 7.0 - 25.0    Anion Gap 10.9 5.0 - 15.0 mmol/L    eGFR 90.4 >60.0 mL/min/1.73   CBC Auto Differential    Collection Time: 12/04/24  4:31 PM    Specimen: Arm, Right; Blood   Result Value Ref Range    WBC 6.72 3.40 - 10.80 10*3/mm3    RBC 4.25 3.77 - 5.28 10*6/mm3    Hemoglobin 14.0 12.0 - 15.9 g/dL    Hematocrit 41.0 34.0 - 46.6 %    MCV 96.5 79.0 - 97.0 fL    MCH 32.9 26.6 - 33.0 pg    MCHC 34.1 31.5 - 35.7 g/dL    RDW 12.4 12.3 - 15.4 %    RDW-SD 43.5 37.0 - 54.0 fl    MPV 8.8 6.0 - 12.0 fL    Platelets 282 140 - 450 10*3/mm3    Neutrophil % 55.8 42.7 - 76.0 %    Lymphocyte % 33.6 19.6 - 45.3 %    Monocyte % 7.9 5.0 - 12.0 %    Eosinophil % 1.9 0.3 - 6.2 %    Basophil % 0.4 0.0 - 1.5 %    Immature Grans % 0.4 0.0 - 0.5 %    Neutrophils, Absolute 3.74 1.70 - 7.00 10*3/mm3    Lymphocytes, Absolute  2.26 0.70 - 3.10 10*3/mm3    Monocytes, Absolute 0.53 0.10 - 0.90 10*3/mm3    Eosinophils, Absolute 0.13 0.00 - 0.40 10*3/mm3    Basophils, Absolute 0.03 0.00 - 0.20 10*3/mm3    Immature Grans, Absolute 0.03 0.00 - 0.05 10*3/mm3    nRBC 0.0 0.0 - 0.2 /100 WBC   Duplex Venous Lower Extremity - Left CAR    Collection Time: 12/04/24  5:07 PM   Result Value Ref Range    Right Common Femoral Spont Y     Right Common Femoral Competent Y     Right Common Femoral Phasic Y     Right Common Femoral Compress C     Right Common Femoral Augment Y     Left Common Femoral Spont Y     Left Common Femoral Competent Y     Left Common Femoral Phasic Y     Left Common Femoral Compress C     Left Common Femoral Augment Y     Left Saphenofemoral Junction Compress C     Left Profunda Femoral Compress C     Left Proximal Femoral Compress C     Left Mid Femoral Spont Y     Left Mid Femoral Competent Y     Left Mid Femoral Phasic Y     Left Mid Femoral Compress C     Left Mid Femoral Augment Y     Left Distal Femoral Compress C     Left Popliteal Spont Y     Left Popliteal Competent Y     Left Popliteal Phasic Y     Left Popliteal Compress C     Left Popliteal Augment Y     Left Posterior Tibial Compress C     Left Peroneal Compress C     Left Gastronemius Compress C     Left Greater Saph AK Compress C     Left Greater Saph BK Compress C     Left Lesser Saph Compress C     BH CV VAS PRELIMINARY FINDINGS SCRIPTING 1.0        Ordered the above labs and reviewed the results.        RADIOLOGY  CT Lumbar Spine Without Contrast    Result Date: 12/4/2024  CT SCAN OF THE LUMBAR SPINE WITHOUT CONTRAST  CLINICAL HISTORY: Left hip/back pain radiating down the leg  TECHNIQUE: CT scan of the lumbar spine was obtained with a combination of 3 , 2, and 1 mm axial images. Bone and soft tissue algorithm images were obtained with sagittal and coronal reconstructed images.  FINDINGS: Transitional lumbar vertebral anatomy with 6 nonrib-bearing lumbar-type  vertebrae with a partially lumbarized S1. Mild lumbar spinal levocurvature. Grade 1 L4 on L5 anterolisthesis. Vertebral body heights are maintained without fracture. Moderate disc degeneration L4-L5 and L5-S1 with associated disc bulges.. More mild disc degeneration throughout the remaining lumbar spine. Moderate facet arthropathy L4-L5. Likely mild spinal canal stenosis L5-S1 secondary to disc bulge and ligamentum flavum hypertrophy (series 6/image 12). On the left, moderate neuroforaminal stenosis L5-S1 secondary to disc and facet joint degeneration. On the right, mild neuroforaminal stenosis L5-S1 secondary to disc and facet joint degeneration. Mild neuroforaminal stenosis on the right at L4-L5 secondary to disc and facet joint degeneration.        1. Please note, transitional lumbar vertebral anatomy with 6 nonrib-bearing lumbar-type vertebrae with a partially lumbarized S1 2. No lumbar spine fracture. Grade 1 L4 on L5 anterolisthesis of indeterminate chronicity. 2. Degenerative changes as detailed above notable for moderate neuroforaminal stenosis on the left and mild neuroforaminal stenosis on the right at L5-S1 secondary to disc and facet joint degeneration.     Radiation dose reduction techniques were utilized, including automated exposure control and exposure modulation based on body size.  This report was finalized on 12/4/2024 7:05 PM by Dr. Donis Armijo M.D on Workstation: [id number]      Duplex Venous Lower Extremity - Left CAR    Result Date: 12/4/2024    Normal left lower extremity venous duplex scan.     XR Hip With or Without Pelvis 2 - 3 View Left    Result Date: 12/4/2024  XR HIP W OR WO PELVIS 2-3 VIEW LEFT-   INDICATION: Pain  COMPARISON: None  TECHNIQUE: 1 view pelvis and two-view left hip  FINDINGS:  No fracture. No bone lesion. Right L5/S1 pseudoarticulation. No dislocation. Preserved hip joint spaces.      No fracture or dislocation. Preserved left hip joint space.  This report was  finalized on 12/4/2024 4:25 PM by Dr. aNtan Miranda M.D on Workstation: [id number]       Ordered the above noted radiological studies. Reviewed by me in PACS.        MEDICATIONS GIVEN IN ER  Medications   sodium chloride 0.9 % flush 10 mL (has no administration in time range)   Lidocaine 4 % 1 patch (1 patch Transdermal Medication Applied 12/4/24 1619)   predniSONE (DELTASONE) tablet 60 mg (60 mg Oral Given 12/4/24 1620)   ketorolac (TORADOL) injection 15 mg (15 mg Intravenous Given 12/4/24 1629)   methocarbamol (ROBAXIN) tablet 750 mg (750 mg Oral Given 12/4/24 1620)   morphine injection 4 mg (4 mg Intravenous Given 12/4/24 1754)   ondansetron (ZOFRAN) injection 4 mg (4 mg Intravenous Given 12/4/24 1754)   HYDROcodone-acetaminophen (NORCO) 5-325 MG per tablet 1 tablet (1 tablet Oral Given 12/4/24 1753)           MEDICAL DECISION MAKING, PROGRESS, and CONSULTS    All labs have been independently reviewed by me.  All radiology studies have been reviewed by me and I have also reviewed the radiology report.   EKG's independently viewed and interpreted by me.  Discussion below represents my analysis of pertinent findings related to patient's condition, differential diagnosis, treatment plan and final disposition.    58-year-old female who presents with acute on chronic left hip pain and decreased mobility.  Pain did have some improvement after multiple rounds, but maintained presents.  She had difficulty ambulating without assistance and was not back to her baseline.  Offered admission for MRI and neurosurgery consult and patient  are agreeable to plan at this time.          Shared decision making/consideration for admission:  admit      Orders placed during this visit:  Orders Placed This Encounter   Procedures    XR Hip With or Without Pelvis 2 - 3 View Left    CT Lumbar Spine Without Contrast    Comprehensive Metabolic Panel    CBC Auto Differential    Ambulate patient    Insert Peripheral IV    Initiate  ED Observation Status    CBC & Differential         Differential diagnosis include, but not limited to: DVT, sciatica, muscle spasm, compression fracture      Additional orders considered but not ordered: dilaudid     Independent interpretation of labs, radiology studies, and discussions with consultants:    Discussed with Dr. Tripp, who agrees with plan.     ED Course as of 12/04/24 1925   Wed Dec 04, 2024   1645 Independently reviewed the x-ray of the left hip and my interpretation is no obvious fracture or dislocation. [JG]   1646 Updated patient on x-ray imaging. [JG]   1710 Discussed with vascular tech, patient negative for DVT. No incidentals reported.  [JG]   1919 Having difficulty with ambulation, plan to admit for MRI and neurosurgery consult. [JG]   1923 Discussed with ART Manriquez who agrees to obs admission. No further recommendations.  [JG]      ED Course User Index  [JG] Destinee Ramirez APRN             DIAGNOSIS  Final diagnoses:   Sciatica of left side   Acute hip pain, left   Degeneration of intervertebral disc of lumbar region, unspecified whether pain present   Abnormal CT scan, lumbar spine         DISPOSITION  admit        Latest Documented Vital Signs:  As of 19:25 EST  BP- 160/100 HR- 82 Temp- 98.9 °F (37.2 °C) (Tympanic) O2 sat- 97%              --    Please note that portions of this were completed with a voice recognition program.       Note Disclaimer: At [facility], we believe that sharing information builds trust and better relationships. You are receiving this note because you are receiving care at [facility] or recently visited. It is possible you will see health information before a provider has talked with you about it. This kind of information can be easy to misunderstand. To help you fully understand what it means for your health, we urge you to discuss this note with your provider.             Destinee Ramirez APRN  12/04/24 1925

## 2024-12-05 ENCOUNTER — ANESTHESIA EVENT (OUTPATIENT)
Dept: PAIN MEDICINE | Facility: HOSPITAL | Age: 59
End: 2024-12-05
Payer: COMMERCIAL

## 2024-12-05 ENCOUNTER — APPOINTMENT (OUTPATIENT)
Dept: GENERAL RADIOLOGY | Facility: HOSPITAL | Age: 59
End: 2024-12-05
Payer: COMMERCIAL

## 2024-12-05 ENCOUNTER — APPOINTMENT (OUTPATIENT)
Dept: PAIN MEDICINE | Facility: HOSPITAL | Age: 59
End: 2024-12-05
Payer: COMMERCIAL

## 2024-12-05 ENCOUNTER — ANESTHESIA (OUTPATIENT)
Dept: PAIN MEDICINE | Facility: HOSPITAL | Age: 59
End: 2024-12-05
Payer: COMMERCIAL

## 2024-12-05 VITALS
HEIGHT: 63 IN | SYSTOLIC BLOOD PRESSURE: 111 MMHG | DIASTOLIC BLOOD PRESSURE: 77 MMHG | TEMPERATURE: 97.3 F | OXYGEN SATURATION: 94 % | HEART RATE: 6 BPM | BODY MASS INDEX: 24.1 KG/M2 | RESPIRATION RATE: 16 BRPM | WEIGHT: 136.02 LBS

## 2024-12-05 LAB
ANION GAP SERPL CALCULATED.3IONS-SCNC: 9.8 MMOL/L (ref 5–15)
BUN SERPL-MCNC: 18 MG/DL (ref 6–20)
BUN/CREAT SERPL: 26.5 (ref 7–25)
CALCIUM SPEC-SCNC: 9.1 MG/DL (ref 8.6–10.5)
CHLORIDE SERPL-SCNC: 102 MMOL/L (ref 98–107)
CO2 SERPL-SCNC: 24.2 MMOL/L (ref 22–29)
CREAT SERPL-MCNC: 0.68 MG/DL (ref 0.57–1)
DEPRECATED RDW RBC AUTO: 41.8 FL (ref 37–54)
EGFRCR SERPLBLD CKD-EPI 2021: 100.5 ML/MIN/1.73
ERYTHROCYTE [DISTWIDTH] IN BLOOD BY AUTOMATED COUNT: 12 % (ref 12.3–15.4)
GLUCOSE SERPL-MCNC: 140 MG/DL (ref 65–99)
HCT VFR BLD AUTO: 39.1 % (ref 34–46.6)
HGB BLD-MCNC: 13.6 G/DL (ref 12–15.9)
MCH RBC QN AUTO: 33.3 PG (ref 26.6–33)
MCHC RBC AUTO-ENTMCNC: 34.8 G/DL (ref 31.5–35.7)
MCV RBC AUTO: 95.8 FL (ref 79–97)
PLATELET # BLD AUTO: 298 10*3/MM3 (ref 140–450)
PMV BLD AUTO: 9 FL (ref 6–12)
POTASSIUM SERPL-SCNC: 4.2 MMOL/L (ref 3.5–5.2)
RBC # BLD AUTO: 4.08 10*6/MM3 (ref 3.77–5.28)
SODIUM SERPL-SCNC: 136 MMOL/L (ref 136–145)
WBC NRBC COR # BLD AUTO: 9.97 10*3/MM3 (ref 3.4–10.8)

## 2024-12-05 PROCEDURE — 25010000002 DEXAMETHASONE SODIUM PHOSPHATE 20 MG/5ML SOLUTION

## 2024-12-05 PROCEDURE — 25010000002 MIDAZOLAM PER 1 MG: Performed by: ANESTHESIOLOGY

## 2024-12-05 PROCEDURE — G0378 HOSPITAL OBSERVATION PER HR: HCPCS

## 2024-12-05 PROCEDURE — 80048 BASIC METABOLIC PNL TOTAL CA: CPT

## 2024-12-05 PROCEDURE — 85027 COMPLETE CBC AUTOMATED: CPT

## 2024-12-05 PROCEDURE — 96376 TX/PRO/DX INJ SAME DRUG ADON: CPT

## 2024-12-05 PROCEDURE — 99203 OFFICE O/P NEW LOW 30 MIN: CPT

## 2024-12-05 PROCEDURE — 25010000002 LIDOCAINE 1 % SOLUTION: Performed by: ANESTHESIOLOGY

## 2024-12-05 PROCEDURE — 77003 FLUOROGUIDE FOR SPINE INJECT: CPT

## 2024-12-05 PROCEDURE — 25010000002 METHYLPREDNISOLONE PER 80 MG: Performed by: ANESTHESIOLOGY

## 2024-12-05 RX ORDER — FENTANYL CITRATE 50 UG/ML
50 INJECTION, SOLUTION INTRAMUSCULAR; INTRAVENOUS ONCE
Status: DISCONTINUED | OUTPATIENT
Start: 2024-12-05 | End: 2024-12-05

## 2024-12-05 RX ORDER — METHYLPREDNISOLONE ACETATE 80 MG/ML
80 INJECTION, SUSPENSION INTRA-ARTICULAR; INTRALESIONAL; INTRAMUSCULAR; SOFT TISSUE ONCE
Status: COMPLETED | OUTPATIENT
Start: 2024-12-05 | End: 2024-12-05

## 2024-12-05 RX ORDER — MIDAZOLAM HYDROCHLORIDE 1 MG/ML
1 INJECTION, SOLUTION INTRAMUSCULAR; INTRAVENOUS ONCE AS NEEDED
Status: DISCONTINUED | OUTPATIENT
Start: 2024-12-05 | End: 2024-12-05

## 2024-12-05 RX ORDER — LIDOCAINE HYDROCHLORIDE 10 MG/ML
1 INJECTION, SOLUTION INFILTRATION; PERINEURAL ONCE
Status: DISCONTINUED | OUTPATIENT
Start: 2024-12-05 | End: 2024-12-05

## 2024-12-05 RX ORDER — HYDROCODONE BITARTRATE AND ACETAMINOPHEN 5; 325 MG/1; MG/1
1 TABLET ORAL EVERY 6 HOURS PRN
Qty: 12 TABLET | Refills: 0 | Status: SHIPPED | OUTPATIENT
Start: 2024-12-05

## 2024-12-05 RX ORDER — IOPAMIDOL 408 MG/ML
10 INJECTION, SOLUTION INTRATHECAL
Status: DISCONTINUED | OUTPATIENT
Start: 2024-12-05 | End: 2024-12-05

## 2024-12-05 RX ORDER — METHYLPREDNISOLONE ACETATE 80 MG/ML
80 INJECTION, SUSPENSION INTRA-ARTICULAR; INTRALESIONAL; INTRAMUSCULAR; SOFT TISSUE ONCE
Status: DISCONTINUED | OUTPATIENT
Start: 2024-12-05 | End: 2024-12-05

## 2024-12-05 RX ORDER — LIDOCAINE HYDROCHLORIDE 10 MG/ML
1 INJECTION, SOLUTION INFILTRATION; PERINEURAL ONCE
Status: COMPLETED | OUTPATIENT
Start: 2024-12-05 | End: 2024-12-05

## 2024-12-05 RX ORDER — HYDROCODONE BITARTRATE AND ACETAMINOPHEN 5; 325 MG/1; MG/1
1 TABLET ORAL EVERY 6 HOURS PRN
Status: DISCONTINUED | OUTPATIENT
Start: 2024-12-05 | End: 2024-12-05 | Stop reason: HOSPADM

## 2024-12-05 RX ORDER — METHYLPREDNISOLONE 4 MG/1
TABLET ORAL
Qty: 21 TABLET | Refills: 0 | Status: SHIPPED | OUTPATIENT
Start: 2024-12-05

## 2024-12-05 RX ORDER — METHOCARBAMOL 750 MG/1
750 TABLET, FILM COATED ORAL EVERY 6 HOURS PRN
Qty: 20 TABLET | Refills: 0 | Status: SHIPPED | OUTPATIENT
Start: 2024-12-05

## 2024-12-05 RX ORDER — MIDAZOLAM HYDROCHLORIDE 1 MG/ML
1 INJECTION, SOLUTION INTRAMUSCULAR; INTRAVENOUS ONCE AS NEEDED
Status: COMPLETED | OUTPATIENT
Start: 2024-12-05 | End: 2024-12-05

## 2024-12-05 RX ADMIN — DEXAMETHASONE SODIUM PHOSPHATE 4 MG: 4 INJECTION, SOLUTION INTRAMUSCULAR; INTRAVENOUS at 03:50

## 2024-12-05 RX ADMIN — MIDAZOLAM 2 MG: 1 INJECTION INTRAMUSCULAR; INTRAVENOUS at 12:12

## 2024-12-05 RX ADMIN — METHOCARBAMOL TABLETS 750 MG: 750 TABLET, COATED ORAL at 02:09

## 2024-12-05 RX ADMIN — METHOCARBAMOL TABLETS 750 MG: 750 TABLET, COATED ORAL at 08:14

## 2024-12-05 RX ADMIN — OXYCODONE AND ACETAMINOPHEN 1 TABLET: 5; 325 TABLET ORAL at 04:04

## 2024-12-05 RX ADMIN — HYDROCODONE BITARTRATE AND ACETAMINOPHEN 1 TABLET: 5; 325 TABLET ORAL at 10:07

## 2024-12-05 RX ADMIN — LIDOCAINE HYDROCHLORIDE 1 ML: 10 INJECTION, SOLUTION INFILTRATION; PERINEURAL at 12:12

## 2024-12-05 RX ADMIN — Medication 10 ML: at 08:14

## 2024-12-05 RX ADMIN — METHYLPREDNISOLONE ACETATE 80 MG: 80 INJECTION, SUSPENSION INTRA-ARTICULAR; INTRALESIONAL; INTRAMUSCULAR; SOFT TISSUE at 12:12

## 2024-12-05 RX ADMIN — HYDROCHLOROTHIAZIDE 25 MG: 25 TABLET ORAL at 08:13

## 2024-12-05 RX ADMIN — METHOCARBAMOL TABLETS 750 MG: 750 TABLET, COATED ORAL at 14:16

## 2024-12-05 NOTE — PROGRESS NOTES
ANITHA PALMA ATTESTATION NOTE    The ART and I have discussed this patient's history, physical exam, and treatment plan.  I have reviewed the documentation and personally had a face to face interaction with the patient. I affirm the documentation and agree with the treatment and plan.  The attached note describes my personal findings.      I provided a substantive portion of the care of this patient. I personally performed the physical exam, in its entirety.    Yu Moise is a 58 y.o. female who presented to the emergency department yesterday complaining of low back pain going down her left hip and leg.  She required admission to the observation unit for further management.  Laboratory evaluation has been normal.  MRI of the lumbar and thoracic spines show no acute process.  CT of the lumbar spine showed no fracture.  Physical therapy has evaluated her this morning.  We are pending neurosurgery consultation.      On exam:  GENERAL: Awake, alert, no acute distress  SKIN: Warm, dry  HENT: Normocephalic, atraumatic  EYES: no scleral icterus  CV: regular rhythm, regular rate  RESPIRATORY: normal effort, lungs clear  ABDOMEN: soft, nontender, nondistended  MUSCULOSKELETAL: no deformity  NEURO: alert, moves all extremities, follows commands      SHIV query complete. Treatment plan to include limited course of prescribed  controlled substance. Risks including addiction, benefits, and alternatives presented to patient.       Note Disclaimer: At [facility], we believe that sharing information builds trust and better relationships. You are receiving this note because you recently visited [facility]. It is possible you will see health information before a provider has talked with you about it. This kind of information can be easy to misunderstand. To help you fully understand what it means for your health, we urge you to discuss this note with your provider.

## 2024-12-05 NOTE — ANESTHESIA PROCEDURE NOTES
PAIN Epidural block    Pre-sedation assessment completed: 12/5/2024 12:08 PM    Patient reassessed immediately prior to procedure    Patient location during procedure: pain clinic  Start Time: 12/5/2024 12:08 PM  Stop Time: 12/5/2024 12:17 PM  Indication:procedure for pain  Performed By  Anesthesiologist: Mari Marquez MD  Preanesthetic Checklist  Completed: patient identified, IV checked, site marked, risks and benefits discussed, surgical consent, monitors and equipment checked, pre-op evaluation and timeout performed  Additional Notes  Fluoro used.    Prep:  Pt Position:prone  Sterile Tech:cap, gloves, mask and sterile barrier  Prep:chlorhexidine gluconate and isopropyl alcohol  Monitoring:blood pressure monitoring, continuous pulse oximetry and EKG  Procedure:Sedation: yes     Approach:left paramedian  Guidance: fluoroscopy  Location:lumbar  Level:4-5  Needle Type:Tuohy  Needle Gauge:20  Aspiration:negative  Medications:  Preservative Free Saline:3mL  Isovue:0mL  Comments:Dye not indicated  Dx: lumbar radiculopathyDepomedrol:80  Post Assessment:  Dressing:occlusive dressing applied  Pt Tolerance:patient tolerated the procedure well with no apparent complications  Complications:no

## 2024-12-05 NOTE — PROGRESS NOTES
MD ATTESTATION NOTE    The ART and I have discussed this patient's history, physical exam, and treatment plan.  I have reviewed the documentation and personally had a face to face interaction with the patient. I affirm the documentation and agree with the treatment and plan.  The attached note describes my personal findings.      I provided a substantive portion of the care of the patient.  I personally performed the physical exam in its entirety, and below are my findings.       Brief HPI: Patient admitted with low back and left hip and leg pain.  Going on for the past several weeks.  Has had some improvement with pain after going to physical therapy.  Has apparently gotten worse in the last week or so after taking a long car trip.  Pain radiates from her low back to her left lower extremity.    PHYSICAL EXAM  ED Triage Vitals   Temp Heart Rate Resp BP SpO2   12/04/24 1513 12/04/24 1513 12/04/24 1513 12/04/24 1515 12/04/24 1513   98.9 °F (37.2 °C) 82 12 160/100 97 %      Temp src Heart Rate Source Patient Position BP Location FiO2 (%)   12/04/24 1513 12/04/24 1513 12/04/24 2042 12/04/24 2042 --   Tympanic Monitor Lying Right arm          GENERAL: no acute distress  HENT: nares patent  EYES: no scleral icterus  CV: regular rhythm, normal rate  RESPIRATORY: normal effort  ABDOMEN: soft  MUSCULOSKELETAL: no deformity  NEURO: alert, moves all extremities, follows commands  PSYCH:  calm, cooperative  SKIN: warm, dry    Vital signs and nursing notes reviewed.        Plan: Neurosurgery consult.  NPO.  Pain control.  MRI lumbar and thoracic spine are pending.

## 2024-12-05 NOTE — CONSULTS
"[facility] NEUROSURGERY CONSULT NOTE    Patient name: Yu Moise  Referring Provider: Mitra Cabrera APRN   Reason for Consultation: Low back pain, hip pain    Patient Care Team:  Sy Zaidi APRN as PCP - General (Nurse Practitioner)  Maame Malloy PA as Physician Assistant (Obstetrics)  Yogesh Nair MD as Obstetrician (Obstetrics and Gynecology)  Maame Malloy PA as Referring Physician (Physician Assistant)    Chief complaint: Low back pain with left leg pain and numbness    Subjective .     History of present illness:    Patient is a 58 y.o.  female with chronic right-sided low back and hip pain who notes that she started experiencing left-sided low back pain and leg pain about 2 months ago.  She notes that it was manageable.  She started going to [facility] physical therapy and attended 8-10 visits.  She notes that this helped mildly but during this time she began experiencing numbness in the left leg.  She now mainly experiences numbness in the foot as well as down the lateral left leg.  She also has pain down the left leg experiences \"sharp\".  She had to take a trip down to the [facility] on Saturday and notes that Saturday night her pain was significantly worse.  She notes worsening pain with walking.  She tried heat, ice, Voltaren gel, lidocaine and Advil without any relief.  She went to the urgent care while in [place] and was prescribed muscle relaxers without any benefit.  She returned home yesterday on 12/4.  She denies weakness in the lower extremities but notes continued numbness in the left leg/foot.  She denies bowel or bladder dysfunction, saddle paresthesia and has no report of any gait instability.  She denies use of anticoagulants or antiplatelet therapy.    Review of Systems  Review of Systems   Gastrointestinal:         Denies bowel issues   Genitourinary:  Negative for enuresis.   Musculoskeletal:  Positive for back pain and gait problem (Due to pain). "   Neurological:  Positive for numbness (Left lateral leg and foot). Negative for weakness.       History  PAST MEDICAL HISTORY  Past Medical History:   Diagnosis Date    Anxiety     Depression     Gestational hypertension 1998    Hypertension     Meniere's disease     PMS (premenstrual syndrome)     Trauma     In my twenties    Urinary tract infection     Varicella        PAST SURGICAL HISTORY  Past Surgical History:   Procedure Laterality Date    ENDOMETRIAL ABLATION      WISDOM TOOTH EXTRACTION         FAMILY HISTORY  Family History   Problem Relation Age of Onset    Breast cancer Mother        SOCIAL HISTORY  Social History     Tobacco Use    Smoking status: Never     Passive exposure: Never    Smokeless tobacco: Never   Vaping Use    Vaping status: Never Used   Substance Use Topics    Alcohol use: No    Drug use: No         Allergies:  Sulfa antibiotics    MEDICATIONS:  Medications Prior to Admission   Medication Sig Dispense Refill Last Dose/Taking    hydroCHLOROthiazide (HYDRODIURIL) 25 MG tablet Take 1 tablet by mouth Every Morning.   12/4/2024    Magnesium 100 MG capsule Take 100 mg by mouth Every Night.   12/3/2024    Multiple Vitamin (multivitamin) capsule Take 1 capsule by mouth Daily.   12/4/2024    sertraline (ZOLOFT) 25 MG tablet Take 1 tablet by mouth Every Night.   12/3/2024    cetirizine (zyrTEC) 10 MG tablet Take 1 tablet by mouth As Needed for Allergies.   Unknown    fluticasone (FLONASE) 50 MCG/ACT nasal spray Administer 2 sprays into the nostril(s) as directed by provider As Needed.   Unknown    linaclotide (LINZESS) 145 MCG capsule capsule Take 1 capsule by mouth Daily.       meclizine (ANTIVERT) 25 MG tablet Take 1 tablet by mouth 3 (Three) Times a Day As Needed.   Unknown    ondansetron (ZOFRAN) 4 MG tablet Take 1 tablet by mouth As Needed for Nausea or Vomiting.   Unknown    predniSONE (DELTASONE) 10 MG tablet Take 1 tablet by mouth As Needed.   Unknown    valACYclovir (VALTREX) 1000 MG  tablet Take 2 tablets by mouth Daily. (Patient taking differently: Take 2 tablets by mouth As Needed.) 30 tablet 3          Current Facility-Administered Medications:     acetaminophen (TYLENOL) tablet 650 mg, 650 mg, Oral, Q4H PRN **OR** acetaminophen (TYLENOL) 160 MG/5ML oral solution 650 mg, 650 mg, Oral, Q4H PRN **OR** acetaminophen (TYLENOL) suppository 650 mg, 650 mg, Rectal, Q4H PRN, Mitra Cabrera APRN    sennosides-docusate (PERICOLACE) 8.6-50 MG per tablet 2 tablet, 2 tablet, Oral, BID PRN **AND** polyethylene glycol (MIRALAX) packet 17 g, 17 g, Oral, Daily PRN **AND** bisacodyl (DULCOLAX) EC tablet 5 mg, 5 mg, Oral, Daily PRN **AND** bisacodyl (DULCOLAX) suppository 10 mg, 10 mg, Rectal, Daily PRN, Mitra Cabrera APRN    Calcium Replacement - Follow Nurse / BPA Driven Protocol, , Not Applicable, PRN, Mitra Cabrera APRN    dexAMETHasone sodium phosphate injection 4 mg, 4 mg, Intravenous, Q8H, Mitra Cabrera APRN, 4 mg at 12/05/24 0350    hydroCHLOROthiazide tablet 25 mg, 25 mg, Oral, QAM, Mitra Cabrera APRN, 25 mg at 12/05/24 0813    HYDROcodone-acetaminophen (NORCO) 5-325 MG per tablet 1 tablet, 1 tablet, Oral, Q6H PRN, Jean Carlos Slade MD    HYDROmorphone (DILAUDID) injection 0.25 mg, 0.25 mg, Intravenous, Q4H PRN, Mitra Cabrera APRN    Magnesium Standard Dose Replacement - Follow Nurse / BPA Driven Protocol, , Not Applicable, PRN, Mitra Cabrera APRN    methocarbamol (ROBAXIN) tablet 750 mg, 750 mg, Oral, Q6H, Mitra Cabrera APRN, 750 mg at 12/05/24 0814    ondansetron ODT (ZOFRAN-ODT) disintegrating tablet 4 mg, 4 mg, Oral, Q6H PRN **OR** ondansetron (ZOFRAN) injection 4 mg, 4 mg, Intravenous, Q6H PRN, Mitra Cabrera APRN    Phosphorus Replacement - Follow Nurse / BPA Driven Protocol, , Not Applicable, PRN, Mitra Cabrera, ANTIONE    Potassium Replacement - Follow Nurse / BPA Driven Protocol, , Not Applicable, PRN, Mitra Cabrera, APRN    sertraline (ZOLOFT) tablet 25 mg, 25 mg, Oral, Nightly,  Rick, Mitra S, APRN, 25 mg at 12/04/24 2320    [COMPLETED] Insert Peripheral IV, , , Once **AND** sodium chloride 0.9 % flush 10 mL, 10 mL, Intravenous, PRN, Rick, Mitra S, APRN    sodium chloride 0.9 % flush 10 mL, 10 mL, Intravenous, Q12H, Rick, Mitra S, APRN, 10 mL at 12/05/24 0814    sodium chloride 0.9 % flush 10 mL, 10 mL, Intravenous, PRN, Rick, Mitra S, APRN    sodium chloride 0.9 % infusion 40 mL, 40 mL, Intravenous, PRN, Rick, Mitra S, APRN      Objective     Results Review:  LABS:  Results from last 7 days   Lab Units 12/05/24  0357 12/04/24  1631   WBC 10*3/mm3 9.97 6.72   HEMOGLOBIN g/dL 13.6 14.0   HEMATOCRIT % 39.1 41.0   PLATELETS 10*3/mm3 298 282     Results from last 7 days   Lab Units 12/05/24  0357 12/04/24  1631   SODIUM mmol/L 136 139   POTASSIUM mmol/L 4.2 3.6   CHLORIDE mmol/L 102 104   CO2 mmol/L 24.2 24.1   BUN mg/dL 18 13   CREATININE mg/dL 0.68 0.76   CALCIUM mg/dL 9.1 9.2   BILIRUBIN mg/dL  --  0.4   ALK PHOS U/L  --  43   ALT (SGPT) U/L  --  25   AST (SGOT) U/L  --  21   GLUCOSE mg/dL 140* 88         DIAGNOSTICS:  MRI thoracic spine without contrast 12/4/2024:  No acute fracture.  Minimal endplate wedging of T6, T11 and T12.  No significant canal or foraminal stenosis.  MRI lumbar spine without contrast 12/4/2024:    Disc herniations L3-4 and L4-5 with moderate to severe L4-5 and moderate right L3-4 foraminal stenosis.  There is some increased fluid within the left L4-5 facet joint.  I reviewed the MRIs and discussed the imaging with Dr. Lan who has also reviewed the imaging on 12/5/2024.  Results Review:   I reviewed the patient's new clinical results.  I personally viewed and interpreted the patient's, imaging studies, medications and chart.    Vital Signs   Temp:  [97.9 °F (36.6 °C)-98.9 °F (37.2 °C)] 97.9 °F (36.6 °C)  Heart Rate:  [69-82] 72  Resp:  [12-18] 18  BP: (102-160)/() 118/82    Physical Exam:  Physical Exam  Constitutional:       General: She is not in  acute distress.     Appearance: Normal appearance. She is not ill-appearing, toxic-appearing or diaphoretic.   HENT:      Head: Normocephalic.      Nose: Nose normal.      Mouth/Throat:      Mouth: Mucous membranes are moist.      Pharynx: Oropharynx is clear.   Eyes:      Extraocular Movements: Extraocular movements intact.      Conjunctiva/sclera: Conjunctivae normal.      Pupils: Pupils are equal, round, and reactive to light.   Cardiovascular:      Rate and Rhythm: Normal rate.   Pulmonary:      Effort: Pulmonary effort is normal.   Musculoskeletal:         General: Normal range of motion.      Cervical back: Normal range of motion.      Lumbar back: No bony tenderness. Negative right straight leg raise test and negative left straight leg raise test.      Comments: Negative SI joint tenderness, negative Lore bilaterally   Skin:     General: Skin is warm.   Neurological:      Mental Status: She is oriented to person, place, and time.      Deep Tendon Reflexes:      Reflex Scores:       Patellar reflexes are 2+ on the right side and 2+ on the left side.       Achilles reflexes are 2+ on the right side and 2+ on the left side.  Psychiatric:         Mood and Affect: Mood normal.         Speech: Speech normal.         Behavior: Behavior normal.         Thought Content: Thought content normal.         Judgment: Judgment normal.       Neurological Exam  Mental Status  Awake, alert and oriented to person, place and time. Oriented to person, place, time and situation. Oriented to person, place, and time. Recent and remote memory are intact. Speech is normal. Language is fluent with no aphasia. Attention and concentration are normal. Fund of knowledge is appropriate for level of education.    Cranial Nerves  CN III, IV, VI: Extraocular movements intact bilaterally. Pupils equal round and reactive to light bilaterally.    Motor                                               Right                     Left  Hip abductor                          5                          5  Hip adductor                         5                          5   Iliopsoas                               5                          5   Quadriceps                           5                          5   Hamstring                             5                          5   Gastrocnemius                     5                           5   Anterior tibialis                      5                          5   Posterior tibialis                    5                          5  Ankle dorsiflexor                   5                          5  Ankle plantar flexor              5                           5    Sensory  Numbness down the left lateral leg and throughout the left foot.    Reflexes                                            Right                      Left  Patellar                                2+                         2+  Achilles                                2+                         2+    Right pathological reflexes: Ankle clonus absent.  Left pathological reflexes: Ankle clonus absent.    Gait    Gait deferred due to pain    Per PT note 12/5/2024:   Pt provided w gentle low back HEP and education. encouraged continued generalized mobilization as lizeth, pt has a cane at home and familly support.).      Assessment & Plan       Hip pain      Problem List Items Addressed This Visit    None  Visit Diagnoses       Sciatica of left side    -  Primary    Acute hip pain, left        Degeneration of intervertebral disc of lumbar region, unspecified whether pain present        Abnormal CT scan, lumbar spine                 COMORBID CONDITIONS:  Chronic low back pain    58-year-old female with chronic right hip pain who notes acute worsening left hip and leg pain over the past week.  The pain started mildly approximately 2 months ago and was manageable and she did some physical therapy which helped minimally.  She exhibits no weakness or myelopathic features on  "exam.  Her only issues are the left leg pain and numbness.  At this point since she has failed medical management and PT, I discussed the option of an epidural steroid injection and discussed the risks and benefits including but not limited to infection, bleeding, nerve damage, CSF leak, coma, seizure and death.  She was agreeable to proceed.  Neurosurgery will follow back up once completed.    Addendum 12/5/2024: 1640 5 PM  I discussed the history, exam and status of the patient after her epidural steroid injection with Dr. Lan.  He request that the patient follow-up in 3 to 4 weeks in our office for clinical check.  In the meantime if she develops any worsening back or leg pain, numbness, tingling, weakness, saddle paresthesia, gait instability or bowel or bladder dysfunction she will let us know.  She should avoid any strenuous activity, heavy lifting or bending/twisting for the next couple weeks.  Neurosurgery recommends that she get into physical therapy in a couple weeks from now.  She should also be discharged home with pain meds, muscle relaxers and Medrol Dosepak.      PLAN:     Pain management consult for lumbar epidural steroid injection  Continue pain management efforts  Recommend starting PT in a couple weeks from now-2-3 times a week for 6 to 8 weeks  Follow-up with neurosurgery in 3 to 4 weeks  Recommend DC home with pain meds, muscle relaxers, Medrol Dosepak  Neurosurgery signing off    I discussed the patient's findings and my recommendations with patient, primary care team, and Dr. Lan.    During patient visit, I utilized appropriate personal protective equipment including gloves and mask.  Mask used was standard procedure mask. Appropriate PPE was worn during the entire visit.  Hand hygiene was completed before and after.     Ethan Jenkins, APRN  12/05/24  09:12 EST    \"Dictated utilizing Dragon dictation\".    "

## 2024-12-05 NOTE — SIGNIFICANT NOTE
12/05/24 1021   OTHER   Discipline physical therapist   Rehab Time/Intention   Session Not Performed   (PT spoke w pt bedside. noted plans for epidural. Pt provided w gentle low back HEP and education. encouraged continued generalized mobilization as lizeth, pt has a cane at home and familly support.)     Consider OP PT at DC pending TERRA recommendations.

## 2024-12-05 NOTE — H&P
[facility]   HISTORY AND PHYSICAL    Patient Name: Yu Moise  : 1965  MRN: [id number]  Primary Care Physician:  Sy Zaidi APRN  Date of admission: 2024    Subjective   Subjective     Chief Complaint:   Chief Complaint   Patient presents with    Hip Pain     Left hip pain.          HPI:    Yu Moise is a 58 y.o. female, with a past medical history including, but not limited to, anxiety, depression, hypertension, presented to the emergency department with a complaint of low back, left hip and left leg pain.  She states that she has had the pain over the past several months.  She electively went to physical therapy with some improvement of her pain.  She however, had a long car ride to and from [place], over the weekend, due to the death of a family member.  Since this Saturday her pain has gotten progressively worse and she has developed numbness in her left foot.  She states the pain radiates from her low back to her left groin and down the lateral side of her left leg and into her left lateral calf.  She states she is unable to ambulate without being in a significant amount of pain.  She denies any injuries,urinary or bowel incontinence or retention.  She denies any saddle anesthesias.  MRI thoracic and lumbar spine pending.  Neurosurgery has been consulted to see the patient in the AM.    Review of Systems   All systems were reviewed and negative except for: What was mentioned above in the HPI.    Personal History     Past Medical History:   Diagnosis Date    Anxiety     Depression     Gestational hypertension 1998    Hypertension     Meniere's disease     PMS (premenstrual syndrome)     Trauma     In my twenties    Urinary tract infection     Varicella        Past Surgical History:   Procedure Laterality Date    ENDOMETRIAL ABLATION      WISDOM TOOTH EXTRACTION         Family History: family history includes Breast cancer in her mother. Otherwise pertinent FHx  was reviewed and not pertinent to current issue.    Social History:  reports that she has never smoked. She has never been exposed to tobacco smoke. She has never used smokeless tobacco. She reports that she does not drink alcohol and does not use drugs.    Home Medications:  Magnesium, cetirizine, fluticasone, hydroCHLOROthiazide, linaclotide, meclizine, ondansetron, predniSONE, and valACYclovir    Allergies:  Allergies   Allergen Reactions    Sulfa Antibiotics Hives and Rash       Objective   Objective     Vitals:   Temp:  [98.9 °F (37.2 °C)] 98.9 °F (37.2 °C)  Heart Rate:  [82] 82  Resp:  [12] 12  BP: (160)/(100) 160/100  Physical Exam   Constitutional: Awake, alert   Eyes: PERRLA   HENT: NCAT, mucous membranes moist   Neck: Supple   Respiratory: Clear to auscultation bilaterally, nonlabored respirations    Cardiovascular: regular rate, palpable pedal pulses bilaterally   Gastrointestinal: Positive bowel sounds, soft, nontender, nondistended   Musculoskeletal: No edema noted.  No tenderness along lumbar spine, left leg normal sensation  Psychiatric: Appropriate affect, cooperative   Neurologic: Oriented x 3, speech clear   Skin: No rashes       Result Review    Result Review:  I have personally reviewed the results from the time of this admission to 12/4/2024 19:31 EST and agree with these findings:  [x]  Laboratory list / accordion  []  Microbiology  [x]  Radiology  []  EKG/Telemetry   []  Cardiology/Vascular   []  Pathology  [x]  Old records  []  Other:    Lab work done in the emergency department is unremarkable.  X-ray of the left hip shows no fracture or dislocation.  Preserved left hip joint space.  CT lumbar spine without contrast shows transitional lumbar vertebrae anatomy with 6 none rib bearing lumbar type vertebrae with a partially lumbarized S1. . Mild lumbar spinal  levocurvature. Grade 1 L4 on L5 anterolisthesis. Vertebral body heights  are maintained without fracture. Moderate disc degeneration  L4-L5 and  L5-S1 with associated disc bulges.. More mild disc degeneration  throughout the remaining lumbar spine. Moderate facet arthropathy L4-L5.  Likely mild spinal canal stenosis L5-S1 secondary to disc bulge and  ligamentum flavum hypertrophy (series 6/image 12). On the left, moderate  neuroforaminal stenosis L5-S1 secondary to disc and facet joint  degeneration. On the right, mild neuroforaminal stenosis L5-S1 secondary  to disc and facet joint degeneration. Mild neuroforaminal stenosis on  the right at L4-L5 secondary to disc and facet joint degeneration.  Duplex venous Doppler left lower extremity is normal      Assessment & Plan   Assessment / Plan     Brief Patient Summary:  Yu Moise is a 58 y.o. female who was admitted to the observation unit for further evaluation and treatment of her low back pain and left hip pain.    Active Hospital Problems:  Active Hospital Problems    Diagnosis     **Hip pain      Plan:     Left hip pain/low back pain  -Vital signs every 4 hours  -Continuous pulse ox  -MRI lumbar spine without contrast-pending  -MRI thoracic spine without contrast-pending  -Neurosurgery consult in a.m.  -Robaxin 750 mg p.o. every 6 hours  -Decadron 8 mg IV x 1 then Decadron 4 mg IV every 8 hours  -Lidocaine patch to low back    VTE Prophylaxis:  Mechanical VTE prophylaxis orders are present.        CODE STATUS:    Code Status (Patient has no pulse and is not breathing): CPR (Attempt to Resuscitate)  Medical Interventions (Patient has pulse or is breathing): Full Support    Admission Status:  I believe this patient meets observation status.    76 minutes have been spent by [facility] providers in the care of this patient while under observation status.      Appropriate PPE worn during patient encounter.  Hand hygeine performed before and after seeing the patient.      Electronically signed by ANTIONE Ogden, 12/04/24, 7:31 PM EST.

## 2024-12-05 NOTE — PLAN OF CARE
Goal Outcome Evaluation:  Plan of Care Reviewed With: patient           Outcome Evaluation: Patient AOx4, up with standby assist. NPO since midnight. MRI completed during shift. PRN pain meds given. Plan for neurosurgery to see

## 2024-12-05 NOTE — H&P
[facility]    History and Physical    Patient Name: Yu Moise  :  1965  MRN:  [id number]  Date of Admission: 2024    Subjective     Patient is a 58 y.o. female presents with chief complaint of chronic, moderate low back, hips: left, and leg: left pain.  Onset of symptoms was gradual starting 2 months ago but has become excruciating over past 5 days.  Symptoms are associated/aggravated by nothing in particular or activity. Symptoms improve with nothing.  Has undergone PT & conservative medication management w/ minimal improvement.  Has been seen by TERRA & referral for LESI which she presents for today.      Mri 24, for full report please see chart:    Spinal canal: No significant central canal stenosis at any level.    Minimal disc bulge L3-4 and L4-5     Neural foramina: Moderate to severe L4-5 and moderate right L3-4   foraminal stenosis.     Facet joints: Increased fluid within the left L4-5 facet joint, cannot   exclude synovitis.     IMPRESSION:         No acute findings.  Degenerative changes as above.     The following portions of the patients history were reviewed and updated as appropriate: current medications, allergies, past medical history, past surgical history, past family history, past social history, and problem list                Objective     Past Medical History:   Past Medical History:   Diagnosis Date    Anxiety     Depression     Gestational hypertension     Hypertension     Meniere's disease     PMS (premenstrual syndrome)     Trauma     In my twenties    Urinary tract infection     Varicella      Past Surgical History:   Past Surgical History:   Procedure Laterality Date    ENDOMETRIAL ABLATION      WISDOM TOOTH EXTRACTION       Family History:   Family History   Problem Relation Age of Onset    Breast cancer Mother      Social History:   Social History     Socioeconomic History    Marital status:    Tobacco Use    Smoking status: Never     Passive  "exposure: Never    Smokeless tobacco: Never   Vaping Use    Vaping status: Never Used   Substance and Sexual Activity    Alcohol use: No    Drug use: No    Sexual activity: Yes     Partners: Male     Birth control/protection: Post-menopausal       Vital Signs Range for the last 24 hours  Temperature: Temp:  [36.3 °C (97.3 °F)-37.2 °C (98.9 °F)] 36.3 °C (97.3 °F)   Temp Source: Temp src: Oral   BP: BP: (102-160)/() 126/84   Pulse: Heart Rate:  [69-82] 79   Respirations: Resp:  [12-18] 16   SPO2: SpO2:  [93 %-98 %] 97 %   O2 Amount (l/min):     O2 Devices Device (Oxygen Therapy): room air   Weight: Weight:  [61.7 kg (136 lb 0.4 oz)] 61.7 kg (136 lb 0.4 oz)     Flowsheet Rows      Flowsheet Row First Filed Value   Admission Height 160 cm (62.99\") Documented at 12/04/2024 2042   Admission Weight 61.7 kg (136 lb 0.4 oz) Documented at 12/04/2024 2042            --------------------------------------------------------------------------------    Current Facility-Administered Medications   Medication Dose Route Frequency Provider Last Rate Last Admin    acetaminophen (TYLENOL) tablet 650 mg  650 mg Oral Q4H PRN Mitra Cabrera APRN        Or    acetaminophen (TYLENOL) 160 MG/5ML oral solution 650 mg  650 mg Oral Q4H PRN Mitra Cabrera APRN        Or    acetaminophen (TYLENOL) suppository 650 mg  650 mg Rectal Q4H PRN Mitra Cabrera APRN        sennosides-docusate (PERICOLACE) 8.6-50 MG per tablet 2 tablet  2 tablet Oral BID PRN Mitra Cabrera APRN        And    polyethylene glycol (MIRALAX) packet 17 g  17 g Oral Daily PRN Mitra Cabrera APRN        And    bisacodyl (DULCOLAX) EC tablet 5 mg  5 mg Oral Daily PRN Mitra Cabrera APRN        And    bisacodyl (DULCOLAX) suppository 10 mg  10 mg Rectal Daily PRN Mitra Cabrera APRN        Calcium Replacement - Follow Nurse / BPA Driven Protocol   Not Applicable PRN Mitra Cabrera APRN        dexAMETHasone sodium phosphate injection 4 mg  4 mg Intravenous Q8H Rick, " ANTIONE Aguilar   4 mg at 12/05/24 0350    fentaNYL citrate (PF) (SUBLIMAZE) injection 50 mcg  50 mcg Intravenous Once Mari Marquez MD        hydroCHLOROthiazide tablet 25 mg  25 mg Oral QAM Mitra Cabrera APRN   25 mg at 12/05/24 0813    HYDROcodone-acetaminophen (NORCO) 5-325 MG per tablet 1 tablet  1 tablet Oral Q6H PRN Jean Carlos Slade MD   1 tablet at 12/05/24 1007    HYDROmorphone (DILAUDID) injection 0.25 mg  0.25 mg Intravenous Q4H PRN Mitra Cabrera APRN        iopamidol (ISOVUE-M 200) injection 41%  10 mL Epidural Once in imaging Mari Marquez MD        lidocaine (XYLOCAINE) 1 % injection 1 mL  1 mL Intradermal Once Mari Marquez MD        Magnesium Standard Dose Replacement - Follow Nurse / BPA Driven Protocol   Not Applicable PRN Mitra Cabrera APRN        methocarbamol (ROBAXIN) tablet 750 mg  750 mg Oral Q6H Mitra Cabrera APRN   750 mg at 12/05/24 0814    methylPREDNISolone acetate (DEPO-medrol) injection 80 mg  80 mg Epidural Once Mari Marquez MD        midazolam (VERSED) injection 1 mg  1 mg Intravenous Once PRN Mari Marquez MD        ondansetron ODT (ZOFRAN-ODT) disintegrating tablet 4 mg  4 mg Oral Q6H PRN Mitra Cabrera APRN        Or    ondansetron (ZOFRAN) injection 4 mg  4 mg Intravenous Q6H PRN Mitra Cabrera APRN        Phosphorus Replacement - Follow Nurse / BPA Driven Protocol   Not Applicable PRN Mitra Cabrera APRN        Potassium Replacement - Follow Nurse / BPA Driven Protocol   Not Applicable PRN Mitra Cabrera APRN        sertraline (ZOLOFT) tablet 25 mg  25 mg Oral Nightly Mitra Cabrera APRN   25 mg at 12/04/24 2320    sodium chloride 0.9 % flush 10 mL  10 mL Intravenous PRN Mitra Cabrera APRN        sodium chloride 0.9 % flush 10 mL  10 mL Intravenous Q12H Mitra Cabrera APRN   10 mL at 12/05/24 0814    sodium chloride 0.9 % flush 10 mL  10 mL Intravenous PRN Mitra Cabrera, APRN        sodium  chloride 0.9 % infusion 40 mL  40 mL Intravenous PRN Mitra Cabrera APRN           --------------------------------------------------------------------------------  Assessment & Plan      Anesthesia Evaluation     Patient summary reviewed and Nursing notes reviewed   no history of anesthetic complications:                Airway   Mallampati: II  Dental      Pulmonary - negative pulmonary ROS   Cardiovascular     (+) hypertension      Neuro/Psych  (+) psychiatric history Depression  GI/Hepatic/Renal/Endo - negative ROS     Musculoskeletal     (+) back pain, chronic pain, gait problem (d/t pain)  Abdominal    Substance History - negative use     OB/GYN    (+) Pregnant, pregnancy induced hypertension        Other                     Diagnosis and Plan    Treatment Plan  ASA 2      Procedures: Lumbar Epidural Steroid Injection(LESI), With fluoroscopy,      Anesthetic plan and risks discussed with patient.          Diagnosis     * Lumbar radiculopathy [M54.16]

## 2024-12-05 NOTE — DISCHARGE INSTRUCTIONS
Medication as prescribed. Follow up with your primary care provider. Return to the emergency department with worsening symptoms, uncontrolled pain, inability to tolerate oral liquids, fever greater than 101°F not controlled by Tylenol or as needed with emergent concerns.     EPIDURAL STEROID INJECTION          An epidural steroid injection is a shot of steroid medicine and numbing medicine that is given into the space between the spinal cord and the bones of the back (epidural space).  The injection helps relieve pain by an irritated or swollen nerve root.    TELL YOUR HEALTH CARE PROVIDER ABOUT:  Any allergies you have  All medicines you are taking including any over the counter medicines  Any blood disorders you have  Any surgeries you have had  Any medical conditions you have  Whether you are pregnant or may be pregnant    WHAT ARE THE RISK?  Generally, this is a safe procedure. However,problems may occur, including  Headache  Bleeding  Infection  Allergic Reaction  Nerve Damage    WHAT CAN I EXPECT AFTER THE PROCEDURE?    INJECTION SITE  Remove the Band-Aid/s after 24 hours  Check your injection site every day for signs of infection.  Check for:             Redness             Bleeding (small amt is normal)             Warmth             Pus or bad odor  Some numbness may be experienced for several hours following the procedure.  Avoid using heat on the injection site for 24 hours. You may use ice intermittently if needed by placing a         towel between your skin and the ice bag and using the ice for 20 minutes 2-3 times a day.  Do not take baths, swim or use a hot tub for 24 hours.    ACTIVITY  No strenuous activity for 24 hours then return to normal activity as tolerated.  If your leg is numb, no driving until full sensation and strength has returned.    GENERAL INSTRUCTIONS:  The injection site may feel numb, use ice with caution if numbness is present and no heat for 24 hours or until numbness is gone.    If you have numbness or weakness in your arm or leg, use those areas with caution until normal sensation returns.  It is not uncommon to notice an increase in discomfort or a change in the location of discomfort for 3-4 days after the procedure.  If discomfort is noticed at the injection site, ice may be            applied to that area for 20 min 2-3 times a day.  Take the pain medicine your physician has prescribed or over the counter pain relievers as long as you do not have any contraindications.  If you are a diabetic, monitor your blood sugar closely.  The steroids used in your procedure may increase your blood sugar level up to 36 hours after the injection.  If your blood sugar is greater than 250, call the physician that helps you monitor your blood sugar.  Keep all follow-up visits as scheduled by your health care provider. This is important.    CONTACT OUR OFFICE IF:  You have any of these signs of infection            -Redness, swelling, or warmth around your injection site.            -Fluid or blood coming from your injection site (small amt of blood is normal)            -Pus or a bad odor from your injection site            -A fever  You develop a severe headache or a stiff neck  You lose control of your bladder or bowel movements      [facility] HOURS   Monday-Friday 7:30 am. - 4:00 pm.  For any problem related to your procedure we can be reached at [phone].  If you experience an emergency with your procedure, call [phone] or go to the emergency room.

## 2024-12-05 NOTE — PROGRESS NOTES
ANITHA PALMA ATTESTATION NOTE    The ART and I have discussed this patient's history, physical exam, and treatment plan.  I have reviewed the documentation and personally had a face to face interaction with the patient. I affirm the documentation and agree with the treatment and plan.  The attached note describes my personal findings.      I provided a substantive portion of the care of this patient. I personally performed the physical exam, in its entirety.    Yu Moise is a 58 y.o. female who presented to the emergency department yesterday complaining of back pain.  The patient had a discectomy on November 7.  She reported severe pain down her left hip and leg. The patient required admission to the observation unit for further evaluation and management.  The patient's laboratory evaluation has remained normal.  She has had MRI of the thoracic and lumbar spines.  This morning she reports she feels much better.  Neurosurgery has evaluated her and is okay with her going home.  We will provide a note to keep her off of work.  She was previously on light duty which was not compatible with her job.  She is anxious for discharge home.      On exam:  GENERAL: Awake, alert, no acute distress  SKIN: Warm, dry  HENT: Normocephalic, atraumatic  EYES: no scleral icterus  CV: regular rhythm, regular rate  RESPIRATORY: normal effort, lungs clear  ABDOMEN: soft, nontender, nondistended  MUSCULOSKELETAL: no deformity  NEURO: alert, moves all extremities, follows commands          Note Disclaimer: At [facility], we believe that sharing information builds trust and better relationships. You are receiving this note because you recently visited [facility]. It is possible you will see health information before a provider has talked with you about it. This kind of information can be easy to misunderstand. To help you fully understand what it means for your health, we urge you to discuss this note with your provider.,ma

## 2024-12-05 NOTE — ED NOTES
Nursing report ED to floor  Yu Moise  58 y.o.  female    HPI :  HPI  Stated Reason for Visit: left hip pain.    Chief Complaint  Chief Complaint   Patient presents with    Hip Pain     Left hip pain.        Admitting doctor:   Fidel ALBARRAN MD    Admitting diagnosis:   The primary encounter diagnosis was Sciatica of left side. Diagnoses of Acute hip pain, left, Degeneration of intervertebral disc of lumbar region, unspecified whether pain present, and Abnormal CT scan, lumbar spine were also pertinent to this visit.    Code status:   Current Code Status       Date Active Code Status Order ID Comments User Context       12/4/2024 1929 CPR (Attempt to Resuscitate) [id number]  Mitra Cabrera, APRN ED        Question Answer    Code Status (Patient has no pulse and is not breathing) CPR (Attempt to Resuscitate)    Medical Interventions (Patient has pulse or is breathing) Full Support                    Allergies:   Sulfa antibiotics    Isolation:   No active isolations    Intake and Output  No intake or output data in the 24 hours ending 12/04/24 1931    Weight:   There were no vitals filed for this visit.    Most recent vitals:   Vitals:    12/04/24 1513 12/04/24 1515   BP:  160/100   Pulse: 82    Resp: 12    Temp: 98.9 °F (37.2 °C)    TempSrc: Tympanic    SpO2: 97%        Active LDAs/IV Access:   Lines, Drains & Airways       Active LDAs       Name Placement date Placement time Site Days    Peripheral IV 12/04/24 1629 Right Forearm 12/04/24  1629  Forearm  less than 1                    Labs (abnormal labs have a star):   Labs Reviewed   CBC WITH AUTO DIFFERENTIAL - Normal   COMPREHENSIVE METABOLIC PANEL    Narrative:     GFR Normal >60  Chronic Kidney Disease <60  Kidney Failure <15     CBC AND DIFFERENTIAL    Narrative:     The following orders were created for panel order CBC & Differential.  Procedure                               Abnormality         Status                     ---------                                -----------         ------                     CBC Auto Differential[[id number]]        Normal              Final result                 Please view results for these tests on the individual orders.       EKG:   No orders to display       Meds given in ED:   Medications   sodium chloride 0.9 % flush 10 mL (has no administration in time range)   Lidocaine 4 % 1 patch (1 patch Transdermal Medication Applied 12/4/24 4586)   sodium chloride 0.9 % flush 10 mL (has no administration in time range)   sodium chloride 0.9 % flush 10 mL (has no administration in time range)   sodium chloride 0.9 % infusion 40 mL (has no administration in time range)   ondansetron ODT (ZOFRAN-ODT) disintegrating tablet 4 mg (has no administration in time range)     Or   ondansetron (ZOFRAN) injection 4 mg (has no administration in time range)   Potassium Replacement - Follow Nurse / BPA Driven Protocol (has no administration in time range)   Magnesium Standard Dose Replacement - Follow Nurse / BPA Driven Protocol (has no administration in time range)   Phosphorus Replacement - Follow Nurse / BPA Driven Protocol (has no administration in time range)   Calcium Replacement - Follow Nurse / BPA Driven Protocol (has no administration in time range)   acetaminophen (TYLENOL) tablet 650 mg (has no administration in time range)     Or   acetaminophen (TYLENOL) 160 MG/5ML oral solution 650 mg (has no administration in time range)     Or   acetaminophen (TYLENOL) suppository 650 mg (has no administration in time range)   methocarbamol (ROBAXIN) tablet 750 mg (has no administration in time range)   sennosides-docusate (PERICOLACE) 8.6-50 MG per tablet 2 tablet (has no administration in time range)     And   polyethylene glycol (MIRALAX) packet 17 g (has no administration in time range)     And   bisacodyl (DULCOLAX) EC tablet 5 mg (has no administration in time range)     And   bisacodyl (DULCOLAX) suppository 10 mg (has no  administration in time range)   predniSONE (DELTASONE) tablet 60 mg (60 mg Oral Given 12/4/24 1620)   ketorolac (TORADOL) injection 15 mg (15 mg Intravenous Given 12/4/24 1629)   methocarbamol (ROBAXIN) tablet 750 mg (750 mg Oral Given 12/4/24 1620)   morphine injection 4 mg (4 mg Intravenous Given 12/4/24 1754)   ondansetron (ZOFRAN) injection 4 mg (4 mg Intravenous Given 12/4/24 1754)   HYDROcodone-acetaminophen (NORCO) 5-325 MG per tablet 1 tablet (1 tablet Oral Given 12/4/24 1753)       Imaging results:  CT Lumbar Spine Without Contrast    Result Date: 12/4/2024  1. Please note, transitional lumbar vertebral anatomy with 6 nonrib-bearing lumbar-type vertebrae with a partially lumbarized S1 2. No lumbar spine fracture. Grade 1 L4 on L5 anterolisthesis of indeterminate chronicity. 2. Degenerative changes as detailed above notable for moderate neuroforaminal stenosis on the left and mild neuroforaminal stenosis on the right at L5-S1 secondary to disc and facet joint degeneration.     Radiation dose reduction techniques were utilized, including automated exposure control and exposure modulation based on body size.  This report was finalized on 12/4/2024 7:05 PM by Dr. Donis Armijo M.D on Workstation: [id number]      XR Hip With or Without Pelvis 2 - 3 View Left    Result Date: 12/4/2024  No fracture or dislocation. Preserved left hip joint space.  This report was finalized on 12/4/2024 4:25 PM by Dr. Natan Miranda M.D on Workstation: [id number]       Ambulatory status:   - X1     Social issues:   Social History     Socioeconomic History    Marital status:    Tobacco Use    Smoking status: Never     Passive exposure: Never    Smokeless tobacco: Never   Vaping Use    Vaping status: Never Used   Substance and Sexual Activity    Alcohol use: No    Drug use: No    Sexual activity: Yes     Partners: Male     Birth control/protection: Post-menopausal       Peripheral Neurovascular  Peripheral Neurovascular  (Adult)  Peripheral Neurovascular WDL: WDL    Neuro Cognitive  Neuro Cognitive (Adult)  Cognitive/Neuro/Behavioral WDL: WDL    Learning  Learning Assessment  Learning Readiness and Ability: no barriers identified  Education Provided  Person Taught: patient, spouse  Teaching Method: verbal instruction    Respiratory  Respiratory  Airway WDL: WDL  Respiratory WDL  Respiratory WDL: WDL    Abdominal Pain       Pain Assessments  Pain (Adult)  (0-10) Pain Rating: Rest: 4  (0-10) Pain Rating: Activity: 7  Pain Location: hip  Response to Pain Interventions: interventions effective per patient    NIH Stroke Scale       Fany Knutson RN  12/04/24 19:31 EST

## 2024-12-05 NOTE — DISCHARGE SUMMARY
ED OBSERVATION PROGRESS/DISCHARGE SUMMARY    Date of Admission: 12/4/2024   LOS: 0 days   PCP: Sy Zaidi, ANTIONE    Subjective patient reports feeling improved following epidural steroid injection today.  Ambulated around unit and states pain is present but significantly improved.    Hospital Outcome: 58-year-old female admitted to the observation unit for low back pain with radiculopathy to left hip and left lower extremity.  Pain has been ongoing for several weeks, patient went to physical therapy and pain started to improve, however after long car ride, pain is worse.  CT lumbar spine in ED shows grade 1 L4 on L5 anterolisthesis, degenerative changes notable for moderate neuroforaminal stenosis on the left and mild neuroforaminal stenosis on the right at L5-S1 secondary to disc and facet joint degeneration.  MRI lumbar and thoracic spine obtained overnight, unrevealing.    12/5/2024: Patient was seen by neurosurgery and underwent lumbar epidural steroid injection.  Patient returned observation unit and ambulated around nursing unit.  Patient reports her pain is significantly improved compared to yesterday.  Patient has been cleared for discharge home.  She will be discharged with muscle relaxers, Medrol Dosepak, and short course of narcotics.  Usual return to ER precautions advised, patient expresses understanding and is in agreement with plan.    ROS:  General: no fevers, chills  Respiratory: no cough, dyspnea  Cardiovascular: no chest pain, palpitations  Abdomen: No abdominal pain, nausea, vomiting, or diarrhea  Neurologic: No focal weakness    Objective   Physical Exam:  I have reviewed the vital signs.  Temp:  [97.9 °F (36.6 °C)-98.9 °F (37.2 °C)] 97.9 °F (36.6 °C)  Heart Rate:  [69-82] 72  Resp:  [12-18] 18  BP: (102-160)/() 118/82  General Appearance:    Alert, cooperative, no distress  Head:    Normocephalic, atraumatic  Eyes:    Sclerae anicteric  Neck:   Supple, no mass  Lungs: Clear to  auscultation bilaterally, respirations unlabored  Heart: Regular rate and rhythm, S1 and S2 normal, no murmur, rub or gallop  Abdomen:  Soft, nontender, bowel sounds active, nondistended  Extremities: No clubbing, cyanosis, or edema to lower extremities  Back: Minimal tenderness to palpation   Pulses:  2+ and symmetric in distal lower extremities  Skin: No rashes   Neurologic: Oriented x3, Normal strength to extremities    Results Review:    I have reviewed the labs, radiology results and diagnostic studies.    Results from last 7 days   Lab Units 12/05/24  0357   WBC 10*3/mm3 9.97   HEMOGLOBIN g/dL 13.6   HEMATOCRIT % 39.1   PLATELETS 10*3/mm3 298     Results from last 7 days   Lab Units 12/05/24  0357 12/04/24  1631   SODIUM mmol/L 136 139   POTASSIUM mmol/L 4.2 3.6   CHLORIDE mmol/L 102 104   CO2 mmol/L 24.2 24.1   BUN mg/dL 18 13   CREATININE mg/dL 0.68 0.76   CALCIUM mg/dL 9.1 9.2   BILIRUBIN mg/dL  --  0.4   ALK PHOS U/L  --  43   ALT (SGPT) U/L  --  25   AST (SGOT) U/L  --  21   GLUCOSE mg/dL 140* 88     Imaging Results (Last 24 Hours)       Procedure Component Value Units Date/Time    MRI Lumbar Spine Without Contrast [[id number]] Collected: 12/05/24 0434     Updated: 12/05/24 0434    Narrative:        Patient: JOSE LUIS BOSCH  Time Out: 04:33  Exam(s): MRI L SPINE Without Contrast     EXAM:    MR Lumbar Spine Without Intravenous Contrast    CLINICAL HISTORY:     Reason for exam: low back pain hip pain.    TECHNIQUE:    Magnetic resonance images of the lumbar spine without intravenous   contrast in multiple planes.    COMPARISON:    No relevant prior studies available.    FINDINGS:    Vertebrae:  No acute fracture.  Minimal anterolisthesis of L3 on L4   from facet arthrosis.    Interspaces: Mild disc height loss L3-4 and L4-5.    Spinal cord:  No abnormal signal at the conus.    Soft tissues:  Unremarkable.     DISCS SPINAL CANAL NEURAL FORAMINA:    Spinal canal: No significant central canal stenosis at  any level.    Minimal disc bulge L3-4 and L4-5    Neural foramina: Moderate to severe L4-5 and moderate right L3-4   foraminal stenosis.    Facet joints: Increased fluid within the left L4-5 facet joint, cannot   exclude synovitis.    IMPRESSION:         No acute findings.  Degenerative changes as above.      Impression:          Electronically signed by Anjel Rivera MD on 12-05-24 at 0433    MRI Thoracic Spine Without Contrast [[id number]] Collected: 12/05/24 0432     Updated: 12/05/24 0432    Narrative:        Patient: JOSE LUIS BOSCH  Time Out: 04:31  Exam(s): MRI T SPINE Without Contrast     EXAM:    MR Thoracic Spine Without Intravenous Contrast    CLINICAL HISTORY:     Reason for exam: hip back pain.    TECHNIQUE:    Magnetic resonance images of the thoracic spine without intravenous   contrast in multiple planes.    COMPARISON:    No relevant prior studies available.    FINDINGS:    Vertebrae:  No acute fracture.  Minimal endplate wedging of T6, T11 and   T12.    Discs spinal canal neural foramina:  No spinal canal stenosis.    Spinal cord:  Normal signal.    Soft tissues:  Unremarkable.    IMPRESSION:         No acute findings.      Impression:          Electronically signed by Anjel Rivera MD on 12-05-24 at 0431    CT Lumbar Spine Without Contrast [[id number]] Collected: 12/04/24 1855     Updated: 12/04/24 1908    Narrative:      CT SCAN OF THE LUMBAR SPINE WITHOUT CONTRAST     CLINICAL HISTORY: Left hip/back pain radiating down the leg     TECHNIQUE: CT scan of the lumbar spine was obtained with a combination  of 3 , 2, and 1 mm axial images. Bone and soft tissue algorithm images  were obtained with sagittal and coronal reconstructed images.     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 Mild lumbar spinal  levocurvature. Grade 1 L4 on L5 anterolisthesis. Vertebral body heights  are maintained without fracture.  Moderate disc degeneration L4-L5 and  L5-S1 with associated disc bulges.. More mild disc degeneration  throughout the remaining lumbar spine. Moderate facet arthropathy L4-L5.  Likely mild spinal canal stenosis L5-S1 secondary to disc bulge and  ligamentum flavum hypertrophy (series 6/image 12). On the left, moderate  neuroforaminal stenosis L5-S1 secondary to disc and facet joint  degeneration. On the right, mild neuroforaminal stenosis L5-S1 secondary  to disc and facet joint degeneration. Mild neuroforaminal stenosis on  the right at L4-L5 secondary to disc and facet joint degeneration.             Impression:      1. Please note, transitional lumbar vertebral anatomy with 6  nonrib-bearing lumbar-type vertebrae with a partially lumbarized S1  2. No lumbar spine fracture. Grade 1 L4 on L5 anterolisthesis of  indeterminate chronicity.  2. Degenerative changes as detailed above notable for moderate  neuroforaminal stenosis on the left and mild neuroforaminal stenosis on  the right at L5-S1 secondary to disc and facet joint degeneration.              Radiation dose reduction techniques were utilized, including automated  exposure control and exposure modulation based on body size.     This report was finalized on 12/4/2024 7:05 PM by Dr. Donis Armijo M.D on Workstation: [id number]       XR Hip With or Without Pelvis 2 - 3 View Left [[id number]] Collected: 12/04/24 1623     Updated: 12/04/24 1628    Narrative:      XR HIP W OR WO PELVIS 2-3 VIEW LEFT-        INDICATION: Pain     COMPARISON: None     TECHNIQUE: 1 view pelvis and two-view left hip     FINDINGS:      No fracture. No bone lesion. Right L5/S1 pseudoarticulation. No  dislocation. Preserved hip joint spaces.       Impression:      No fracture or dislocation. Preserved left hip joint space.     This report was finalized on 12/4/2024 4:25 PM by Dr. Natan Miranda M.D on Workstation: [id number]               I have reviewed the  medications.  ---------------------------------------------------------------------------------------------  Assessment & Plan   Assessment/Problem List    Hip pain      Plan:    Left hip pain/low back pain  -Vital signs every 4 hours  -Continuous pulse ox  -MRI lumbar spine without contrast  -MRI thoracic spine without contrast  -Neurosurgery consult in a.m.  -Robaxin 750 mg p.o. every 6 hours  -Decadron 8 mg IV x 1 then Decadron 4 mg IV every 8 hours  -Lidocaine patch to low back  -Status post lumbar epidural steroid injection on 12/5/2024  -Discharged with Robaxin, Medrol Dosepak, and short course of narcotics    Disposition: Home    Follow-up after Discharge: PCP    30 minutes has been spent by [facility] providers in the care of this patient while under observation status     This note will serve as discharge summary    RICHMOND Morris 12/05/24 08:57 EST    I have worn appropriate PPE during this patient encounter and sanitized my hands with entering and exiting patient room.

## 2024-12-05 NOTE — PLAN OF CARE
Goal Outcome Evaluation: Cleared for discharge.  Going home with scripts for Norco, Robaxin, and Medrol dosepack.  Vital signs stable.  Pain still present but improved after LESI.

## 2024-12-16 ENCOUNTER — TREATMENT (OUTPATIENT)
Dept: PHYSICAL THERAPY | Facility: CLINIC | Age: 59
End: 2024-12-16
Payer: COMMERCIAL

## 2024-12-16 DIAGNOSIS — R29.898 BILATERAL LEG WEAKNESS: ICD-10-CM

## 2024-12-16 DIAGNOSIS — M54.42 ACUTE LEFT-SIDED LOW BACK PAIN WITH LEFT-SIDED SCIATICA: Primary | ICD-10-CM

## 2024-12-16 DIAGNOSIS — M25.552 LEFT HIP PAIN: ICD-10-CM

## 2024-12-16 DIAGNOSIS — R68.89 ACTIVITY INTOLERANCE: ICD-10-CM

## 2024-12-16 PROCEDURE — 97110 THERAPEUTIC EXERCISES: CPT | Performed by: PHYSICAL THERAPIST

## 2024-12-16 PROCEDURE — 97162 PT EVAL MOD COMPLEX 30 MIN: CPT | Performed by: PHYSICAL THERAPIST

## 2024-12-16 PROCEDURE — 97530 THERAPEUTIC ACTIVITIES: CPT | Performed by: PHYSICAL THERAPIST

## 2024-12-16 PROCEDURE — 97112 NEUROMUSCULAR REEDUCATION: CPT | Performed by: PHYSICAL THERAPIST

## 2024-12-16 NOTE — PROGRESS NOTES
Physical Therapy Initial Evaluation and Plan of Care  [facility] [place]   [address], [address]  [place] [address]  P: [phone]  F: [phone]    Patient: Yu Moise   : 1965  Diagnosis/ICD-10 Code:  Acute left-sided low back pain with left-sided sciatica [M54.42]  Referring practitioner: RICHMOND Morris  Date of Initial Visit: 2024  Today's Date: 2024  Patient seen for 1 session         Visit Diagnoses:    ICD-10-CM ICD-9-CM   1. Acute left-sided low back pain with left-sided sciatica  M54.42 724.2     724.3   2. Left hip pain  M25.552 719.45   3. Activity intolerance  R68.89 780.99   4. Bilateral leg weakness  R29.898 729.89       PMHx Reviewed : 2024      Subjective Evaluation    History of Present Illness  Mechanism of injury: Hx:   Pt reports to clinic for IE of acute L side hip and LBP w/ sciatica. Pt reports going to [place] for a family emergency . Pt notes having severe pain in her lip and she wasn't able to walk. Pt went to ED , was kept overnight. CT, XRAY, MRI was done. Pt was given muscle relaxer and epidural as well. See MRI impression in obj.   Pt was told she will be contacted by neuro but has not heard from them yet.       Pt notes experiencing numbness and pain in her left leg/foot.   Pt has been doing light stretching which seems to help.     PMH includes:  - N/A    Pt reported difficulties:  - self reported fxn status =  40/100%  - bending fwd  - squatting  - walking (short distances)  - stairs (slower)  - standing (2 hrs)  - sleeping (can't lay on L)    Hobbies (impacted by dysfxn):  - yoga  - walking (1 mile daily)       Patient Occupation:  (sits alot) Quality of life: excellent    Pain  Current pain rating: 3  At best pain rating: 3  At worst pain rating: 10  Quality: sharp, burning and radiating  Relieving factors: medications, change in position and ice  Aggravating factors: stairs, lifting,  standing, ambulation, prolonged positioning, sleeping and squatting  Progression: improved    Social Support  Lives in: multiple-level home  Lives with: spouse    Treatments  Previous treatment: physical therapy, massage and medication  Patient Goals  Patient goals for therapy: return to sport/leisure activities, return to work and decreased pain             Objective          Active Range of Motion     Lumbar   Flexion: WFL  Extension: 10 degrees with pain  Left lateral flexion: 20 degrees with pain  Right lateral flexion: 20 degrees with pain    Strength/Myotome Testing     Left Hip   Planes of Motion   Flexion: 4  Abduction: 5  Adduction: 5  External rotation: 4-  Internal rotation: 4+    Right Hip   Planes of Motion   Flexion: 4+  Abduction: 5  Adduction: 5  External rotation: 4  Internal rotation: 4+    Left Knee   Flexion: 5  Extension: 5    Right Knee   Flexion: 5  Extension: 5    Left Ankle/Foot   Dorsiflexion: 5    Right Ankle/Foot   Dorsiflexion: 5    Tests     Lumbar     Left   Positive passive SLR.     Right   Negative passive SLR.     Left Hip   Negative ALVA.     Right Hip   Negative ALVA.      General Comments     Lumbar Comments  MRI  12/4/24  Spinal canal: No significant central canal stenosis at any level.    Minimal disc bulge L3-4 and L4-5     Neural foramina: Moderate to severe L4-5 and moderate right L3-4   foraminal stenosis.     Facet joints: Increased fluid within the left L4-5 facet joint, cannot   exclude synovitis.           Assessment & Plan       Assessment  Impairments: abnormal gait, abnormal or restricted ROM, activity intolerance, impaired physical strength, lacks appropriate home exercise program and pain with function   Functional limitations: carrying objects, lifting, sleeping, walking, pulling, pushing, uncomfortable because of pain, sitting, standing, stooping and unable to perform repetitive tasks   Assessment details: Pt presents to clinic for IE of acute L hip and LBP w/  sciatica. Pt demo clinical presentation of core musculature weakness, decreased lumbar ROM, decreased hip strength and ROM, and pain/activity intolerance. Pt was pos for L SLR special test. Pt denied s/s of active cancer, AAA, cauda equina syndrome as potential cause of s/s. Pt will benefit from skilled PT services at this time to address found dysfxn in order to achieve outlined goals in POC for return to PLOF.   Prognosis: good    Goals  Plan Goals: STG [achieve in 4 weeks]  1. Pt will be compliant w/ HEP and attendance w/ scheduled PT services.  2. Pt will improve L LE MMT scores to 4/5 for fxn strength w/ IADLs.   3. Pt will increase lumbar ext ROM by 5 degrees for improved fxn mobility w/ IADLs.   4. Pt will be able to perfrom fxn movement pattern, such as bending fwd, pain free in order to improve pt QoL, IADLs (laundry) and progress POC PT interventions.     LTG [achieve in 8 weeks]   1. Pt will report a 20% or more in self reported fxn status in order to return to or exceed PLOF.   2. Pt will increase LEFS score by 20% to show minimal to no lvl of dissability for pt QoL and return to or exceed PLOF.   3. Pt will improve L LE MMT scores to 4+/5 for improved fxn performance of IADLs.   4. Pt will be negative for L SLR special test indicating resolution/improvement of c/c and efficacy of skilled PT services.     Plan  Therapy options: will be seen for skilled therapy services  Planned modality interventions: cryotherapy, dry needling, iontophoresis, TENS and traction  Planned therapy interventions: abdominal trunk stabilization, body mechanics training, flexibility, functional ROM exercises, gait training, home exercise program, joint mobilization, manual therapy, motor coordination training, neuromuscular re-education, postural training, soft tissue mobilization, spinal/joint mobilization, strengthening, stretching and therapeutic activities  Frequency: 1x week  Duration in weeks: 8  Treatment plan discussed  with: patient  Plan details: Access Code [email or web address]          Manual Therapy:     0     mins  52427;  Therapeutic Exercise:     15     mins  92488;     Neuromuscular Cathi:       10    mins  55342;    Therapeutic Activity:      10     mins  86215;     Gait Trainin     mins  01727;     Ultrasound:      0     mins  48849;    Electrical Stimulation:     0     mins  27219 ( );  Dry Needling      0     mins self-pay  Traction      0     mins 78966  Canalith Repositioning    0     mins 82246      Timed Treatment:   35   mins   Total Treatment:     60   mins      PT: James Angel PT     License Number: [id number]  Electronically signed by James Angel PT, 24, 8:02 AM EST    Certification Period: 2024 thru 3/15/2025  I certify that the therapy services are furnished while this patient is under my care.  The services outlined above are required by this patient, and will be reviewed every 90 days.         Physician Signature:__________________________________________________    PHYSICIAN: Clementina Ac PA  NPI: [id number]                                      DATE:      Please sign in Epic or return via fax to [email or web address] . Thank you, [facility].

## 2024-12-18 ENCOUNTER — TELEPHONE (OUTPATIENT)
Dept: NEUROSURGERY | Facility: CLINIC | Age: 59
End: 2024-12-18

## 2024-12-18 NOTE — TELEPHONE ENCOUNTER
PT CALLED TO SCHEDULE HER HOSPITAL FOLLOW UP-[facility]UB HAS NO AVAILABILITY. PLEASE ADVISE! THANKS!      Consults by Ethan Jenkins APRN (12/05/2024 08:45)       PT CONTACT: [phone]  BEST TIME TO CALL: ANYTIME

## 2024-12-19 ENCOUNTER — HOSPITAL ENCOUNTER (OUTPATIENT)
Facility: HOSPITAL | Age: 59
Discharge: HOME | End: 2024-12-19
Admitting: PHYSICIAN ASSISTANT
Payer: COMMERCIAL

## 2024-12-19 DIAGNOSIS — Z15.89 BIALLELIC MUTATION OF SDHA GENE: ICD-10-CM

## 2024-12-19 PROCEDURE — 77067 SCR MAMMO BI INCL CAD: CPT

## 2024-12-19 PROCEDURE — 77063 BREAST TOMOSYNTHESIS BI: CPT

## 2024-12-20 ENCOUNTER — TELEPHONE (OUTPATIENT)
Dept: NEUROSURGERY | Facility: CLINIC | Age: 59
End: 2024-12-20
Payer: COMMERCIAL

## 2024-12-20 NOTE — TELEPHONE ENCOUNTER
Called patient and scheduled appt. LVM If current appt does not work for patient. She will have to wait until the schedules open in Jan to be schedule

## 2024-12-26 ENCOUNTER — TREATMENT (OUTPATIENT)
Dept: PHYSICAL THERAPY | Facility: CLINIC | Age: 59
End: 2024-12-26
Payer: COMMERCIAL

## 2024-12-26 DIAGNOSIS — M25.552 LEFT HIP PAIN: ICD-10-CM

## 2024-12-26 DIAGNOSIS — M54.42 ACUTE LEFT-SIDED LOW BACK PAIN WITH LEFT-SIDED SCIATICA: Primary | ICD-10-CM

## 2024-12-26 DIAGNOSIS — R29.898 BILATERAL LEG WEAKNESS: ICD-10-CM

## 2024-12-26 DIAGNOSIS — R68.89 ACTIVITY INTOLERANCE: ICD-10-CM

## 2024-12-26 NOTE — PROGRESS NOTES
"Physical Therapy Daily Treatment Note  [facility] [place]   [address], [address]  [place] [address]  P: [phone]  F: [phone]    Patient: Yu Moise   : 1965  Referring practitioner: RICHMOND Morris  Date of Initial Visit: Type: THERAPY  Noted: 2024  Today's Date: 2024  Patient seen for 2 sessions       Visit Diagnoses:    ICD-10-CM ICD-9-CM   1. Acute left-sided low back pain with left-sided sciatica  M54.42 724.2     724.3   2. Left hip pain  M25.552 719.45   3. Activity intolerance  R68.89 780.99   4. Bilateral leg weakness  R29.898 729.89         Yu Moise reports: Pt reports she is doing \"much better\" regarding her pain. Pt notes she is still having the numbness in her L foot/leg but there was one day where she did not. Pt notes being able to go to yoga which she \"took it easy\" and she didn't haven any issues. No new/other complaints/comments noted.     Subjective     Objective   See Exercise, Manual, and Modality Logs for complete treatment.       Assessment/Plan  Pt presented to clinic for first f/u visit since IE. Pt demonstrated mostly correct performance of prescribed HEP interventions. Pt required min VC/TC for correct biomechanics w/ therex interventions to avoid compensatory movements of  lumbo-pelvic musculature. Pt questions/concerns were answered and addressed regarding HEP.     Plan:  Pt will continue with current plan of care to achieve outlined goals. Therapeutic interventions will be progressed where and when appropriate.        Timed:         Manual Therapy:    0     mins  16802;     Therapeutic Exercise:    12     mins  71247;     Neuromuscular Cathi:    8    mins  76783;    Therapeutic Activity:     8     mins  90887;     Gait Trainin     mins  85487;     Ultrasound:     0     mins  14773;    Ionto                               0    mins  33802  Self Care                       0     mins  " 44786  Traction     0     mins 17860      Un-Timed:  Canalith Repos    0     mins 78456  Electrical Stimulation:    0     mins  22427 ( );  Dry Needling     0     mins self-pay  Traction     0     mins 38939        Timed Treatment:   28   mins   Total Treatment:     28   mins    James Angel, PT  [place] License #: [id number]    Physical Therapist

## 2024-12-31 ENCOUNTER — TELEPHONE (OUTPATIENT)
Dept: OBSTETRICS AND GYNECOLOGY | Age: 59
End: 2024-12-31
Payer: COMMERCIAL

## 2025-01-15 ENCOUNTER — TREATMENT (OUTPATIENT)
Dept: PHYSICAL THERAPY | Facility: CLINIC | Age: 60
End: 2025-01-15
Payer: COMMERCIAL

## 2025-01-15 DIAGNOSIS — R29.898 BILATERAL LEG WEAKNESS: ICD-10-CM

## 2025-01-15 DIAGNOSIS — M54.42 ACUTE LEFT-SIDED LOW BACK PAIN WITH LEFT-SIDED SCIATICA: Primary | ICD-10-CM

## 2025-01-15 DIAGNOSIS — R68.89 ACTIVITY INTOLERANCE: ICD-10-CM

## 2025-01-15 DIAGNOSIS — M25.552 LEFT HIP PAIN: ICD-10-CM

## 2025-01-15 NOTE — PROGRESS NOTES
Physical Therapy Daily Treatment Note  [facility] [place]   [address], [address]  [place] [address]  P: [phone]  F: [phone]    Patient: Yu Moise   : 1965  Referring practitioner: RICHMOND Morris  Date of Initial Visit: Type: THERAPY  Noted: 2024  Today's Date: 1/15/2025  Patient seen for 3 sessions       Visit Diagnoses:    ICD-10-CM ICD-9-CM   1. Acute left-sided low back pain with left-sided sciatica  M54.42 724.2     724.3   2. Left hip pain  M25.552 719.45   3. Activity intolerance  R68.89 780.99   4. Bilateral leg weakness  R29.898 729.89         Yu Moise reports: Pt reports she is ok. Pt notes her LBP is better but she still has issues and is now c/o R hip pain. Pt notes she has less N/T in her L leg  now. Pt has been performing her HEP as instructed w/out issue/complaint and reports these are helping w/ her s/s. No new/other complaints/comments noted.     Subjective     Objective   See Exercise, Manual, and Modality Logs for complete treatment.     Assessment/Plan  New interventions were added (quadruped leg ext, RDL, palloff press) to continue w/ PT PoC for fxn strength and neuromuscular control of lumbo-pelvic musculature to assist w/ (core stability, LBP). Pt was able to perform requested interventions today w/out exasperation of LBP s/s. Manual therapy was utilized to improve B hip ROM and decrease pt reported pain s/s.      Plan:  Pt will continue with current plan of care to achieve outlined goals. Therapeutic interventions will be progressed where and when appropriate.        Timed:         Manual Therapy:    8     mins  13205;     Therapeutic Exercise:    12     mins  20004;     Neuromuscular Cathi:    0    mins  48896;    Therapeutic Activity:     10     mins  77323;     Gait Trainin     mins  47052;     Ultrasound:     0     mins  49377;    Ionto                               0    mins  57272  Self Care                        0     mins  14729  Traction     0     mins 42773      Un-Timed:  Canalith Repos    0     mins 64975  Electrical Stimulation:    0     mins  80337 ( );  Dry Needling     0     mins self-pay  Traction     0     mins 45967        Timed Treatment:   30   mins   Total Treatment:     30   mins    James Angel, PT  [place] License #: [id number]    Physical Therapist

## 2025-01-21 ENCOUNTER — TELEPHONE (OUTPATIENT)
Dept: NEUROSURGERY | Facility: CLINIC | Age: 60
End: 2025-01-21
Payer: COMMERCIAL

## 2025-01-21 NOTE — TELEPHONE ENCOUNTER
Spoke w/ pt to reschedule appointment w/ Valeria TLABOT. Patient now has an appointment on 01/31/2025 @ 2:15pm w/ Norah TALBOT

## 2025-01-22 ENCOUNTER — TREATMENT (OUTPATIENT)
Dept: PHYSICAL THERAPY | Facility: CLINIC | Age: 60
End: 2025-01-22
Payer: COMMERCIAL

## 2025-01-22 DIAGNOSIS — R29.898 BILATERAL LEG WEAKNESS: ICD-10-CM

## 2025-01-22 DIAGNOSIS — M25.552 LEFT HIP PAIN: ICD-10-CM

## 2025-01-22 DIAGNOSIS — R68.89 ACTIVITY INTOLERANCE: ICD-10-CM

## 2025-01-22 DIAGNOSIS — M54.42 ACUTE LEFT-SIDED LOW BACK PAIN WITH LEFT-SIDED SCIATICA: Primary | ICD-10-CM

## 2025-01-22 NOTE — PROGRESS NOTES
Physical Therapy Daily Treatment Note  [facility] [place]   [address], [address]  [place] [address]  P: [phone]  F: [phone]    Patient: Yu Moise   : 1965  Referring practitioner: RICHMOND Morris  Date of Initial Visit: Type: THERAPY  Noted: 2024  Today's Date: 2025  Patient seen for 4 sessions       Visit Diagnoses:    ICD-10-CM ICD-9-CM   1. Acute left-sided low back pain with left-sided sciatica  M54.42 724.2     724.3   2. Left hip pain  M25.552 719.45   3. Activity intolerance  R68.89 780.99   4. Bilateral leg weakness  R29.898 729.89         Yu Moise reports: Pt reports she is ok. Pt notes she has been feeling some increased L leg pain/numbness. Pt can't recall any changes or events that may have caused this. No new/other complaints/comments noted.       Subjective   Pt reported difficulties:  - self reported fxn status =  40/100% = 60%  - bending fwd = improved 60%  - squatting = improved 50%  - walking (short distances) = improved 80%  - stairs (slower) = unchanged   - standing (2 hrs) = unchanged   - sleeping (can't lay on L) = improved 50%      Hobbies (impacted by dysfxn):  - yoga = x2 classes (better but not back to PLOF)   - walking (1 mile daily) = improved 3/4 mile  Objective   See Exercise, Manual, and Modality Logs for complete treatment.   Active Range of Motion      Lumbar   Flexion: WFL  Extension: 10 degrees with pain =20 0/10 pain   Left lateral flexion: 20 degrees with pain = 20 w/ pain (5/10)  Right lateral flexion: 20 degrees with pain =25 0/10      Strength/Myotome Testing      Left Hip   Planes of Motion   Flexion: 4 = 4+  Abduction: 5  Adduction: 5  External rotation: 4- =4+  Internal rotation: 4+     Right Hip   Planes of Motion   Flexion: 4+ = 5  Abduction: 5  Adduction: 5  External rotation: 4 = 4+  Internal rotation: 4+     Left Knee   Flexion: 5  Extension: 5     Right Knee   Flexion:  5  Extension: 5     Left Ankle/Foot   Dorsiflexion: 5     Right Ankle/Foot   Dorsiflexion: 5     Tests      Lumbar      Left   Positive passive SLR.      Right   Negative passive SLR.      Left Hip   Negative ALVA.      Right Hip   Negative ALVA.       Assessment/Plan  Pt was assessed for progress w/ PT PoC today. Pt has met 75% of STG at time of PN. HEP was updated today to progress PT PoC to achieve outlined goals for pt rehab.   Pt has unmet goals and remaining functional deficits that warrant continued skilled PT services w/in the PoC at this time.       Goals  Plan Goals: STG [achieve in 4 weeks]  1. Pt will be compliant w/ HEP and attendance w/ scheduled PT services. = MET  2. Pt will improve L LE MMT scores to 4/5 for fxn strength w/ IADLs. = MET  3. Pt will increase lumbar ext ROM by 5 degrees for improved fxn mobility w/ IADLs. = MET  4. Pt will be able to perfrom fxn movement pattern, such as bending fwd, pain free in order to improve pt QoL, IADLs (laundry) and progress POC PT interventions. = PROGRESSING      LTG [achieve in 8 weeks] = not assessed at 4 wk PN  1. Pt will report a 20% or more in self reported fxn status in order to return to or exceed PLOF.   2. Pt will increase LEFS score by 20% to show minimal to no lvl of dissability for pt QoL and return to or exceed PLOF.   3. Pt will improve L LE MMT scores to 4+/5 for improved fxn performance of IADLs.   4. Pt will be negative for L SLR special test indicating resolution/improvement of c/c and efficacy of skilled PT services.        Timed:         Manual Therapy:    0     mins  70774;     Therapeutic Exercise:    11     mins  79337;     Neuromuscular Cathi:    8    mins  00496;    Therapeutic Activity:     10     mins  82717;     Gait Trainin     mins  28870;     Ultrasound:     0     mins  18501;    Ionto                               0    mins  73555  Self Care                       0     mins  18353  Traction     0     mins  92506      Un-Timed:  Canalith Repos    0     mins 13866  Electrical Stimulation:    0     mins  35126 ( );  Dry Needling     0     mins self-pay  Traction     0     mins 73857        Timed Treatment:   29   mins   Total Treatment:     29   mins    James Angel, PT  [place] License #: [id number]    Physical Therapist

## 2025-01-28 ENCOUNTER — TELEPHONE (OUTPATIENT)
Dept: NEUROSURGERY | Facility: CLINIC | Age: 60
End: 2025-01-28
Payer: COMMERCIAL

## 2025-01-28 NOTE — TELEPHONE ENCOUNTER
PT was left a Vmail from [facility] for PT to call back. PTcalled back to speak w/Marlyn [facility], for a call back. Please call.        Thank you

## 2025-01-28 NOTE — TELEPHONE ENCOUNTER
Called patient and left VM in regards to her appt with Nurse Almazan on 1/31/2025. Asked patient to call me back in the office so that I can reschedule with the times Nurse Almazan has given me to go over with patient.

## 2025-01-28 NOTE — PROGRESS NOTES
Subjective   Patient ID: Yu Moise is a 59 y.o. female is here today for hospital follow-up of left low back and leg pain.  She was seen as an inpatient on 12/5/2024.  She been having symptoms for about 2 months but significantly worsened after a long car ride.  She is having pain and numbness in the left lateral leg into the foot.  She failed conservative management including heat, ice, Voltaren gel, Lidoderm and over-the-counter NSAID as well as muscle relaxer prescribed by urgent care.  She had lumbar epidural injection on 12/5 and was discharged home with Medrol Dosepak and steroids.    History of Present Illness  Today, patient reports chronic, manageable R hip and leg pain that she feels might be related to SI joint from prolonged sitting at work, but the current issue has been in the left leg with pain beginning in hip and some numbness in leg. She did OP PT on her own. Some slight improvement but worsening with the long car ride. It was a severe cramping pain that would not relent. Following the epidural, she continues to some constant element of numbness in the leg and into the middle toes of the left foot. It seems that any prolonged sitting, standing seems to aggravate it. Overall, pain is at least 60-70% better. She continues to do PT. No weakness.    No prior spine surgery.  No history of cancer.  Non-smoker.    The following portions of the patient's history were reviewed and updated as appropriate: allergies, current medications, past family history, past medical history, past social history, past surgical history, and problem list.    ROS:    Leg pain. numbness    Objective     Vitals:    01/30/25 0840   BP: 120/82   Pulse: 74   SpO2: 97%   Weight: 64 kg (141 lb)   PainSc:   3     Body mass index is 24.98 kg/m².      Physical Exam  Vitals reviewed.   Constitutional:       Appearance: Normal appearance.   Pulmonary:      Effort: Pulmonary effort is normal.   Musculoskeletal:      Lumbar  back: No tenderness or bony tenderness. Negative right straight leg raise test and negative left straight leg raise test.   Skin:     General: Skin is warm and dry.   Neurological:      General: No focal deficit present.      Deep Tendon Reflexes:      Reflex Scores:       Patellar reflexes are 3+ on the right side and 3+ on the left side.       Achilles reflexes are 2+ on the right side and 2+ on the left side.  Psychiatric:         Mood and Affect: Mood normal.         Thought Content: Thought content normal.       Neurological Exam  Mental Status  Awake, alert and oriented to person, place and time.    Motor  Normal muscle bulk throughout. No fasciculations present. Normal muscle tone. No abnormal involuntary movements.  5/5 BLE.    Sensory  Light touch is normal in upper and lower extremities.     Reflexes                                            Right                      Left  Patellar                                3+                         3+  Achilles                                2+                         2+    Right pathological reflexes: Ankle clonus absent.  Left pathological reflexes: Ankle clonus absent.    Gait  Casual gait is normal including stance, stride, and arm swing.Normal toe walking. Normal heel walking.          Assessment & Plan   Independent Review of Radiographic Studies:      I personally reviewed the images from the following studies.    MRI thoracic spine-no evidence of acute abnormality.    MRI lumbar spine reveals transitional anatomy with 6 non rib-bearing lumbar vertebrae.  The lowest disc space will be called L5/6. there are degenerative endplate changes at the superior L6 vertebral body.  There is mild anterolisthesis of L4 on 5 with unroofing of disc resulting in flattening of the anterior thecal sac and mild canal stenosis.  There is mild to moderate right neuroforaminal narrowing at L4/5.  At L5/S1 there is facet hypertrophy and arthropathy and central disc herniation  "with severe left and moderate right neuroforaminal narrowing    Medical Decision Making:      Patient presents for follow-up after hospital evaluation of acute left low back and leg pain.  Chronic right hip and leg pain which has been manageable with conservative cares over several years but her acute pain was on her left.  She received lumbar epidural injection at \"L4/5\", but she does have transitional anatomy with 6 nonrib-bearing lumbar vertebrae so keeping this in mind, the injection was at L5/6.  She reports 60 to 70% improvement of her leg pain.  She still has some numbness.  On exam she has good strength and sensation.  Overall she is better.  I recommend additional epidural injection although we will do a laminar injection as she has a chronic right-sided pain which hopefully that will help as well.  She will continue therapy and nonimpact exercise.  I have given her a refill of her Robaxin as it seems to help her intermittently.  She will follow-up in 3 months for clinical check or sooner for worsening issues.  She does have a spondylolisthesis at L4/5 which will need to be monitored if her back pain or symptoms worsen.    Diagnoses and all orders for this visit:    1. Facet arthritis of lumbar region (Primary)  -     Epidural Block    2. Lumbar disc herniation with radiculopathy  -     Epidural Block    3. Degeneration of intervertebral disc of lumbar region with discogenic back pain and lower extremity pain  -     Epidural Block    Other orders  -     methocarbamol (ROBAXIN) 750 MG tablet; Take 1 tablet by mouth Every 6 (Six) Hours As Needed for Muscle Spasms.  Dispense: 20 tablet; Refill: 0      Return in about 3 months (around 4/30/2025) for Follow up with APC, Lisa Booth PT., APRN  01/30/2025   09:23 EST    \"Dictated utilizing Dragon dictation\".      "

## 2025-01-29 ENCOUNTER — TREATMENT (OUTPATIENT)
Dept: PHYSICAL THERAPY | Facility: CLINIC | Age: 60
End: 2025-01-29
Payer: COMMERCIAL

## 2025-01-29 ENCOUNTER — TELEPHONE (OUTPATIENT)
Dept: NEUROSURGERY | Facility: CLINIC | Age: 60
End: 2025-01-29
Payer: COMMERCIAL

## 2025-01-29 DIAGNOSIS — R68.89 ACTIVITY INTOLERANCE: ICD-10-CM

## 2025-01-29 DIAGNOSIS — R29.898 BILATERAL LEG WEAKNESS: ICD-10-CM

## 2025-01-29 DIAGNOSIS — M25.552 LEFT HIP PAIN: ICD-10-CM

## 2025-01-29 DIAGNOSIS — M54.42 ACUTE LEFT-SIDED LOW BACK PAIN WITH LEFT-SIDED SCIATICA: Primary | ICD-10-CM

## 2025-01-29 NOTE — TELEPHONE ENCOUNTER
Called patient back and left VM. Informed her Nurse Norah does not have any other clinic times available to switch at this moment. If she can still make 1/30/2025 at 8:30 am that would be best due to the nurse practitioner schedule.

## 2025-01-29 NOTE — PROGRESS NOTES
Physical Therapy Daily Treatment Note  [facility] [place]   [address], [address]  [place] [address]  P: [phone]  F: [phone]    Patient: Yu Moise   : 1965  Referring practitioner: RICHMOND Morris  Date of Initial Visit: Type: THERAPY  Noted: 2024  Today's Date: 2025  Patient seen for 5 sessions       Visit Diagnoses:    ICD-10-CM ICD-9-CM   1. Acute left-sided low back pain with left-sided sciatica  M54.42 724.2     724.3   2. Left hip pain  M25.552 719.45   3. Activity intolerance  R68.89 780.99   4. Bilateral leg weakness  R29.898 729.89         Yu Moise reports: Pt reports she is doing ok and about the same as last visit. No new/other complaints/comments noted.       Subjective     Objective   See Exercise, Manual, and Modality Logs for complete treatment.       Assessment/Plan  New interventions were added (STS, quad lum stretch) to continue w/ PT PoC for fxn strength and neuromuscular control of lumbo-pelvic musculature to assist w/ (LBP, lumbar stability).   Interventions were progressed today (bridges, palloff press, lat walks, RDLs) w/ increased levels of resistance indicating improved fxn strength gains of the lumbo-pelvic musculature musculature indicating improvement w/ IADLs (LBP, lumbar stability). Pt was able to perform requested interventions today w/out exasperation of LBP s/s .       Plan:  Pt will continue with current plan of care to achieve outlined goals. Therapeutic interventions will be progressed where and when appropriate.        Timed:         Manual Therapy:    0     mins  08184;     Therapeutic Exercise:    11     mins  23897;     Neuromuscular Cathi:    8    mins  86123;    Therapeutic Activity:     8     mins  58913;     Gait Trainin     mins  66397;     Ultrasound:     0     mins  44462;    Ionto                               0    mins  92504  Self Care                       0     mins   65826  Traction     0     mins 09326      Un-Timed:  Canalith Repos    0     mins 37933  Electrical Stimulation:    0     mins  56468 ( );  Dry Needling     0     mins self-pay  Traction     0     mins 84537        Timed Treatment:   27   mins   Total Treatment:     27   mins    James Angel, PT  [place] License #: [id number]    Physical Therapist

## 2025-01-29 NOTE — TELEPHONE ENCOUNTER
PT called to see if there was any way she could come in today this afternoon,or one day next week only if its it available. If not she will keep tomorrows's jeromy. Please advise.        Thank you

## 2025-01-30 ENCOUNTER — TELEPHONE (OUTPATIENT)
Dept: NEUROSURGERY | Facility: CLINIC | Age: 60
End: 2025-01-30
Payer: COMMERCIAL

## 2025-01-30 ENCOUNTER — OFFICE VISIT (OUTPATIENT)
Dept: NEUROSURGERY | Facility: CLINIC | Age: 60
End: 2025-01-30
Payer: COMMERCIAL

## 2025-01-30 VITALS
HEART RATE: 74 BPM | OXYGEN SATURATION: 97 % | BODY MASS INDEX: 24.98 KG/M2 | DIASTOLIC BLOOD PRESSURE: 82 MMHG | SYSTOLIC BLOOD PRESSURE: 120 MMHG | WEIGHT: 141 LBS

## 2025-01-30 DIAGNOSIS — M47.816 FACET ARTHRITIS OF LUMBAR REGION: Primary | ICD-10-CM

## 2025-01-30 DIAGNOSIS — M51.362 DEGENERATION OF INTERVERTEBRAL DISC OF LUMBAR REGION WITH DISCOGENIC BACK PAIN AND LOWER EXTREMITY PAIN: ICD-10-CM

## 2025-01-30 DIAGNOSIS — M51.16 LUMBAR DISC HERNIATION WITH RADICULOPATHY: ICD-10-CM

## 2025-01-30 RX ORDER — MULTIVIT-MIN/IRON FUM/FOLIC AC 7.5 MG-4
1 TABLET ORAL DAILY
COMMUNITY

## 2025-01-30 RX ORDER — PREDNISONE 10 MG/1
10 TABLET ORAL
COMMUNITY
End: 2025-01-30

## 2025-01-30 RX ORDER — POLYETHYLENE GLYCOL 3350 17 G/17G
17 POWDER, FOR SOLUTION ORAL
COMMUNITY
End: 2025-01-30

## 2025-01-30 RX ORDER — TRIAMTERENE AND HYDROCHLOROTHIAZIDE 37.5; 25 MG/1; MG/1
1 CAPSULE ORAL DAILY
COMMUNITY
End: 2025-01-30 | Stop reason: ALTCHOICE

## 2025-01-30 RX ORDER — METHOCARBAMOL 750 MG/1
750 TABLET, FILM COATED ORAL EVERY 6 HOURS PRN
Qty: 20 TABLET | Refills: 0 | Status: SHIPPED | OUTPATIENT
Start: 2025-01-30

## 2025-01-30 RX ORDER — LANOLIN ALCOHOL/MO/W.PET/CERES
400 CREAM (GRAM) TOPICAL DAILY
COMMUNITY

## 2025-01-30 NOTE — TELEPHONE ENCOUNTER
Left message to Patient's voicemail informing her that she has an appointment on 04- @ 9:30am w/ Valeria Eckert for a 3 month follow-up.

## 2025-02-04 ENCOUNTER — TREATMENT (OUTPATIENT)
Dept: PHYSICAL THERAPY | Facility: CLINIC | Age: 60
End: 2025-02-04
Payer: COMMERCIAL

## 2025-02-04 DIAGNOSIS — M54.42 ACUTE LEFT-SIDED LOW BACK PAIN WITH LEFT-SIDED SCIATICA: Primary | ICD-10-CM

## 2025-02-04 DIAGNOSIS — M25.552 LEFT HIP PAIN: ICD-10-CM

## 2025-02-04 DIAGNOSIS — R29.898 BILATERAL LEG WEAKNESS: ICD-10-CM

## 2025-02-04 DIAGNOSIS — R68.89 ACTIVITY INTOLERANCE: ICD-10-CM

## 2025-02-04 PROCEDURE — 97112 NEUROMUSCULAR REEDUCATION: CPT | Performed by: PHYSICAL THERAPIST

## 2025-02-04 PROCEDURE — 97530 THERAPEUTIC ACTIVITIES: CPT | Performed by: PHYSICAL THERAPIST

## 2025-02-04 PROCEDURE — 97110 THERAPEUTIC EXERCISES: CPT | Performed by: PHYSICAL THERAPIST

## 2025-02-04 NOTE — PROGRESS NOTES
Physical Therapy Daily Treatment Note  [facility] [place]   [address], [address]  [place] [address]  P: [phone]  F: [phone]    Patient: Yu Moise   : 1965  Referring practitioner: RICHMOND Morris  Date of Initial Visit: Type: THERAPY  Noted: 2024  Today's Date: 2025  Patient seen for 6 sessions       Visit Diagnoses:    ICD-10-CM ICD-9-CM   1. Acute left-sided low back pain with left-sided sciatica  M54.42 724.2     724.3   2. Left hip pain  M25.552 719.45   3. Activity intolerance  R68.89 780.99   4. Bilateral leg weakness  R29.898 729.89         Yu Moise reports: Pt reports she is alright. Pt notes increased numbness sensation in her L foot. Pt notes she took her dog for a longer walk than usual the other day and is unsure of this caused her increased pain/numbness. Pt saw the neurologist last week and was advised to schedule another epidural. No new/other complaints/comments noted.     Subjective     Objective   See Exercise, Manual, and Modality Logs for complete treatment.     Assessment/Plan  Due to pt radicular s/s exasperation, interventions were regressed to pt tolerance today in order to avoid worsening s/s. Pt was able to perform requested interventions today w/out exasperation of radicular or LBP s/s.     Plan:  Pt will continue with current plan of care to achieve outlined goals. Therapeutic interventions will be progressed where and when appropriate.        Timed:         Manual Therapy:    0     mins  26361;     Therapeutic Exercise:    11     mins  99439;     Neuromuscular Cathi:    10    mins  94402;    Therapeutic Activity:     10     mins  10129;     Gait Trainin     mins  20230;     Ultrasound:     0     mins  03613;    Ionto                               0    mins  86986  Self Care                       0     mins  39972  Traction     0     mins 70175      Un-Timed:  Canalith Repos    0     mins  99178  Electrical Stimulation:    0     mins  03114 ( );  Dry Needling     0     mins self-pay  Traction     0     mins 10504        Timed Treatment:   31   mins   Total Treatment:     31   mins    WALI Segovia License #: [id number]    Physical Therapist

## 2025-02-06 ENCOUNTER — TREATMENT (OUTPATIENT)
Dept: PHYSICAL THERAPY | Facility: CLINIC | Age: 60
End: 2025-02-06
Payer: COMMERCIAL

## 2025-02-06 DIAGNOSIS — M54.42 ACUTE LEFT-SIDED LOW BACK PAIN WITH LEFT-SIDED SCIATICA: Primary | ICD-10-CM

## 2025-02-06 DIAGNOSIS — R29.898 BILATERAL LEG WEAKNESS: ICD-10-CM

## 2025-02-06 DIAGNOSIS — R68.89 ACTIVITY INTOLERANCE: ICD-10-CM

## 2025-02-06 DIAGNOSIS — M25.552 LEFT HIP PAIN: ICD-10-CM

## 2025-02-06 NOTE — PROGRESS NOTES
Physical Therapy Daily Treatment Note  [facility] [place]   [address], [address]  [place] [address]  P: [phone]  F: [phone]    Patient: Yu Moise   : 1965  Referring practitioner: RICHMOND Morris  Date of Initial Visit: Type: THERAPY  Noted: 2024  Today's Date: 2025  Patient seen for 7 sessions       Visit Diagnoses:    ICD-10-CM ICD-9-CM   1. Acute left-sided low back pain with left-sided sciatica  M54.42 724.2     724.3   2. Left hip pain  M25.552 719.45   3. Activity intolerance  R68.89 780.99   4. Bilateral leg weakness  R29.898 729.89         Yu Moise reports: Pt reports she is doing alright. Pt notes she woke up w/ increased numbness today. Pt notes she has been sitting more recently and feels this has contributed to her increased s/s. Pt notes less numbness since last visit. No new/other complaints/comments noted.     Subjective     Objective   See Exercise, Manual, and Modality Logs for complete treatment.     Assessment/Plan  Due to pt radicular s/s exasperation, interventions were performed to pt tolerance today in order to avoid worsening s/s. Pt was able to perform requested interventions today w/out exasperation of radicular s/s .     Plan:  Pt will continue with current plan of care to achieve outlined goals. Therapeutic interventions will be progressed where and when appropriate.      Timed:         Manual Therapy:    0     mins  08934;     Therapeutic Exercise:    12     mins  03799;     Neuromuscular Cathi:    8    mins  28634;    Therapeutic Activity:     10     mins  73609;     Gait Trainin     mins  70061;     Ultrasound:     0     mins  64981;    Ionto                               0    mins  73743  Self Care                       0     mins  81938  Traction     0     mins 20558      Un-Timed:  Canalith Repos    0     mins 91561  Electrical Stimulation:    0     mins  33845 ( );  Dry  Needling     0     mins self-pay  Traction     0     mins 50160        Timed Treatment:   30   mins   Total Treatment:     30   mins    James Angel, PT  [place] License #: [id number]    Physical Therapist

## 2025-02-10 ENCOUNTER — TREATMENT (OUTPATIENT)
Dept: PHYSICAL THERAPY | Facility: CLINIC | Age: 60
End: 2025-02-10
Payer: COMMERCIAL

## 2025-02-10 DIAGNOSIS — M54.42 ACUTE LEFT-SIDED LOW BACK PAIN WITH LEFT-SIDED SCIATICA: Primary | ICD-10-CM

## 2025-02-10 DIAGNOSIS — R68.89 ACTIVITY INTOLERANCE: ICD-10-CM

## 2025-02-10 DIAGNOSIS — R29.898 BILATERAL LEG WEAKNESS: ICD-10-CM

## 2025-02-10 DIAGNOSIS — M25.552 LEFT HIP PAIN: ICD-10-CM

## 2025-02-10 PROCEDURE — 97112 NEUROMUSCULAR REEDUCATION: CPT | Performed by: PHYSICAL THERAPIST

## 2025-02-10 PROCEDURE — 97530 THERAPEUTIC ACTIVITIES: CPT | Performed by: PHYSICAL THERAPIST

## 2025-02-10 PROCEDURE — 97110 THERAPEUTIC EXERCISES: CPT | Performed by: PHYSICAL THERAPIST

## 2025-02-10 NOTE — PROGRESS NOTES
"Physical Therapy Daily Treatment Note  [facility] [place]   [address], [address]  [place] [address]  P: [phone]  F: [phone]    Patient: Yu Moise   : 1965  Referring practitioner: RICHMOND Morris  Date of Initial Visit: Type: THERAPY  Noted: 2024  Today's Date: 2/10/2025  Patient seen for 8 sessions       Visit Diagnoses:    ICD-10-CM ICD-9-CM   1. Acute left-sided low back pain with left-sided sciatica  M54.42 724.2     724.3   2. Left hip pain  M25.552 719.45   3. Activity intolerance  R68.89 780.99   4. Bilateral leg weakness  R29.898 729.89         Yu Moise reports: Pt reports she is doing well. Pt notes she went to a couple yoga classes over the weekend which were harder than ones she has done recently. She was sore but her back felt \"better\" later. Pt notes her numbness is less overall. No new/other complaints/comments noted.       Subjective     Objective   See Exercise, Manual, and Modality Logs for complete treatment.       Assessment/Plan  Interventions were progressed today (STS, RDL) w/ increased levels of resistance indicating improved fxn strength gains of the lumbo-pelvic musculature indicating improvement w/  (LBP, lumbar stability ).   HEP was updated today to progress PT PoC to achieve outlined goals for pt rehab.     Plan:  Pt will continue with current plan of care to achieve outlined goals. Therapeutic interventions will be progressed where and when appropriate.        Timed:         Manual Therapy:    0     mins  17335;     Therapeutic Exercise:    10     mins  35778;     Neuromuscular Cathi:    8    mins  20783;    Therapeutic Activity:     8     mins  31663;     Gait Trainin     mins  84553;     Ultrasound:     0     mins  69100;    Ionto                               0    mins  62446  Self Care                       0     mins  62711  Traction     0     mins 42117      Un-Timed:  Canalith Repos    0 "     mins 60317  Electrical Stimulation:    0     mins  40664 ( );  Dry Needling     0     mins self-pay  Traction     0     mins 75174        Timed Treatment:   26   mins   Total Treatment:     26   mins    WALI Segovia License #: [id number]    Physical Therapist

## 2025-02-13 ENCOUNTER — TREATMENT (OUTPATIENT)
Dept: PHYSICAL THERAPY | Facility: CLINIC | Age: 60
End: 2025-02-13
Payer: COMMERCIAL

## 2025-02-13 DIAGNOSIS — R68.89 ACTIVITY INTOLERANCE: ICD-10-CM

## 2025-02-13 DIAGNOSIS — M54.42 ACUTE LEFT-SIDED LOW BACK PAIN WITH LEFT-SIDED SCIATICA: Primary | ICD-10-CM

## 2025-02-13 DIAGNOSIS — R29.898 BILATERAL LEG WEAKNESS: ICD-10-CM

## 2025-02-13 DIAGNOSIS — M25.552 LEFT HIP PAIN: ICD-10-CM

## 2025-02-13 NOTE — PROGRESS NOTES
Physical Therapy Daily Treatment Note  [facility] [place]   [address], [address]  [place] [address]  P: [phone]  F: [phone]    Patient: Yu Moise   : 1965  Referring practitioner: RICHMOND Morris  Date of Initial Visit: Type: THERAPY  Noted: 2024  Today's Date: 2025  Patient seen for 9 sessions       Visit Diagnoses:    ICD-10-CM ICD-9-CM   1. Acute left-sided low back pain with left-sided sciatica  M54.42 724.2     724.3   2. Left hip pain  M25.552 719.45   3. Activity intolerance  R68.89 780.99   4. Bilateral leg weakness  R29.898 729.89         Subjective     Yu Moise reports: L leg is numb. R leg is painful. Soreness in low back. 2/10 pain at best. 7/10 at worst. Epidural is . R hip feels tight in the front.         Objective   See Exercise, Manual, and Modality Logs for complete treatment.       Assessment:  Reviewed HEP with pt, demonstrating good understanding. Pt responds well to self lumbar traction for LBP relief. Introduced Fidel stretch for tight hip flexors, so pt can manage at home. Plan to reassess following upcoming epidural injection.         Plan:  Progress per Plan of Care            Timed:         Manual Therapy:         mins  11043;     Therapeutic Exercise:    25     mins  22618;     Neuromuscular Cathi:    8    mins  52289;    Therapeutic Activity:     13     mins  21393;     Gait Training:           mins  13432;     Ultrasound:          mins  16038;    Ionto                                   mins  67160  Self Care                            mins  55104    Un-Timed:  Electrical Stimulation:         mins  11776 ( );  Traction          mins 15668        Timed Treatment:   46   mins   Total Treatment:     46   mins      Luisito Riddle, Physical Therapist Assistant  [place] License #: [id number]

## 2025-03-07 ENCOUNTER — ANESTHESIA (OUTPATIENT)
Dept: PAIN MEDICINE | Facility: HOSPITAL | Age: 60
End: 2025-03-07
Payer: COMMERCIAL

## 2025-03-07 ENCOUNTER — HOSPITAL ENCOUNTER (OUTPATIENT)
Dept: GENERAL RADIOLOGY | Facility: HOSPITAL | Age: 60
Discharge: HOME | End: 2025-03-07
Payer: COMMERCIAL

## 2025-03-07 ENCOUNTER — ANESTHESIA EVENT (OUTPATIENT)
Dept: PAIN MEDICINE | Facility: HOSPITAL | Age: 60
End: 2025-03-07
Payer: COMMERCIAL

## 2025-03-07 ENCOUNTER — HOSPITAL ENCOUNTER (OUTPATIENT)
Dept: PAIN MEDICINE | Facility: HOSPITAL | Age: 60
Discharge: HOME | End: 2025-03-07
Payer: COMMERCIAL

## 2025-03-07 VITALS
RESPIRATION RATE: 14 BRPM | WEIGHT: 136 LBS | HEART RATE: 68 BPM | TEMPERATURE: 97.8 F | SYSTOLIC BLOOD PRESSURE: 118 MMHG | OXYGEN SATURATION: 97 % | BODY MASS INDEX: 24.1 KG/M2 | HEIGHT: 63 IN | DIASTOLIC BLOOD PRESSURE: 80 MMHG

## 2025-03-07 DIAGNOSIS — R52 PAIN: ICD-10-CM

## 2025-03-07 DIAGNOSIS — M54.16 LUMBAR RADICULOPATHY: Primary | ICD-10-CM

## 2025-03-07 PROCEDURE — 25510000001 IOPAMIDOL 41 % SOLUTION: Performed by: STUDENT IN AN ORGANIZED HEALTH CARE EDUCATION/TRAINING PROGRAM

## 2025-03-07 PROCEDURE — 25010000002 METHYLPREDNISOLONE PER 80 MG: Performed by: STUDENT IN AN ORGANIZED HEALTH CARE EDUCATION/TRAINING PROGRAM

## 2025-03-07 PROCEDURE — 77003 FLUOROGUIDE FOR SPINE INJECT: CPT

## 2025-03-07 PROCEDURE — 25010000002 MIDAZOLAM PER 1 MG: Performed by: STUDENT IN AN ORGANIZED HEALTH CARE EDUCATION/TRAINING PROGRAM

## 2025-03-07 RX ORDER — IOPAMIDOL 408 MG/ML
10 INJECTION, SOLUTION INTRATHECAL
Status: COMPLETED | OUTPATIENT
Start: 2025-03-07 | End: 2025-03-07

## 2025-03-07 RX ORDER — SODIUM CHLORIDE 0.9 % (FLUSH) 0.9 %
10 SYRINGE (ML) INJECTION AS NEEDED
Status: DISCONTINUED | OUTPATIENT
Start: 2025-03-07 | End: 2025-03-08 | Stop reason: HOSPADM

## 2025-03-07 RX ORDER — MIDAZOLAM HYDROCHLORIDE 1 MG/ML
2 INJECTION, SOLUTION INTRAMUSCULAR; INTRAVENOUS ONCE AS NEEDED
Status: COMPLETED | OUTPATIENT
Start: 2025-03-07 | End: 2025-03-07

## 2025-03-07 RX ORDER — SODIUM CHLORIDE 0.9 % (FLUSH) 0.9 %
10 SYRINGE (ML) INJECTION EVERY 12 HOURS SCHEDULED
Status: DISCONTINUED | OUTPATIENT
Start: 2025-03-07 | End: 2025-03-08 | Stop reason: HOSPADM

## 2025-03-07 RX ORDER — LIDOCAINE HYDROCHLORIDE 10 MG/ML
0.5 INJECTION, SOLUTION INFILTRATION; PERINEURAL ONCE AS NEEDED
Status: DISCONTINUED | OUTPATIENT
Start: 2025-03-07 | End: 2025-03-08 | Stop reason: HOSPADM

## 2025-03-07 RX ORDER — SODIUM CHLORIDE 9 MG/ML
40 INJECTION, SOLUTION INTRAVENOUS AS NEEDED
Status: DISCONTINUED | OUTPATIENT
Start: 2025-03-07 | End: 2025-03-08 | Stop reason: HOSPADM

## 2025-03-07 RX ORDER — LIDOCAINE HYDROCHLORIDE 10 MG/ML
1 INJECTION, SOLUTION INFILTRATION; PERINEURAL ONCE
Status: DISCONTINUED | OUTPATIENT
Start: 2025-03-07 | End: 2025-03-08 | Stop reason: HOSPADM

## 2025-03-07 RX ORDER — METHYLPREDNISOLONE ACETATE 80 MG/ML
80 INJECTION, SUSPENSION INTRA-ARTICULAR; INTRALESIONAL; INTRAMUSCULAR; SOFT TISSUE ONCE
Status: COMPLETED | OUTPATIENT
Start: 2025-03-07 | End: 2025-03-07

## 2025-03-07 RX ADMIN — MIDAZOLAM HYDROCHLORIDE 2 MG: 1 INJECTION, SOLUTION INTRAMUSCULAR; INTRAVENOUS at 08:18

## 2025-03-07 RX ADMIN — IOPAMIDOL 10 ML: 408 INJECTION, SOLUTION INTRATHECAL at 08:21

## 2025-03-07 RX ADMIN — METHYLPREDNISOLONE ACETATE 80 MG: 80 INJECTION, SUSPENSION INTRA-ARTICULAR; INTRALESIONAL; INTRAMUSCULAR; SOFT TISSUE at 08:21

## 2025-03-07 NOTE — H&P
INTERVAL HISTORY:    The patient returns for another Lumbar epidural steroid injection #2 today.    They have received at least 50% improvement since their last injection.    Today their pain is a 3-8 out of 10 in the back and bilateral hip/buttock and radiates down the bilateral legs.  Her left leg is having numbness in the right leg has intermittent pain.  The pain limits the patient's activities of daily living including: Mobility and quality sleep    Conservative measures tried in the interim include: rest, ice, heat OTC medications PT home exercises, Robaxin    Current Outpatient Medications on File Prior to Encounter   Medication Sig Dispense Refill    cetirizine (zyrTEC) 10 MG tablet Take 1 tablet by mouth As Needed for Allergies.      fluticasone (FLONASE) 50 MCG/ACT nasal spray Administer 2 sprays into the nostril(s) as directed by provider As Needed.      hydroCHLOROthiazide (HYDRODIURIL) 25 MG tablet Take 1 tablet by mouth Every Morning.      Magnesium 100 MG capsule Take 100 mg by mouth Every Night.      Magnesium Oxide -Mg Supplement 400 (240 Mg) MG tablet Take 1 tablet by mouth Daily.      meclizine (ANTIVERT) 25 MG tablet Take 1 tablet by mouth 3 (Three) Times a Day As Needed.      methocarbamol (ROBAXIN) 750 MG tablet Take 1 tablet by mouth Every 6 (Six) Hours As Needed for Muscle Spasms. 20 tablet 0    Multiple Vitamin (multivitamin) capsule Take 1 capsule by mouth Daily.      multivitamin with minerals tablet tablet Take 1 tablet by mouth Daily.      NON FORMULARY Apply 1 dose topically to the appropriate area as directed Daily. Topical estrodial cream      NON FORMULARY Take 1 tablet by mouth Every Night. Progresterone capsule- pt cannot recall dose      ondansetron (ZOFRAN) 4 MG tablet Take 1 tablet by mouth As Needed for Nausea or Vomiting.      psyllium (METAMUCIL) 58.6 % packet Take 1 packet by mouth Daily.      sertraline (ZOLOFT) 25 MG tablet Take 1 tablet by mouth Every Night.       "valACYclovir (VALTREX) 1000 MG tablet Take 2 tablets by mouth Daily. (Patient taking differently: Take 2 tablets by mouth As Needed.) 30 tablet 3     No current facility-administered medications on file prior to encounter.       Past Medical History:   Diagnosis Date    Anxiety     Depression     Gestational hypertension 1998    Hypertension     Low back pain     Meniere's disease     PMS (premenstrual syndrome)     Trauma     In my twenties    Urinary tract infection     Varicella        No hematologic infectious or constitutional symptoms  Negative screen for SANIA      Exam:  /76 (BP Location: Left arm, Patient Position: Lying)   Pulse 66   Temp 36.6 °C (97.8 °F) (Temporal)   Resp 16   Ht 160 cm (63\")   Wt 61.7 kg (136 lb)   SpO2 98%   BMI 24.09 kg/m²   Alert and oriented  NCAT  RRR  Airway: Mallampati 2  Unlabored respirations  Extremities WWP    Diagnosis:  Post-Op Diagnosis Codes:     * Lumbar radiculopathy [M54.16]    Plan:  Lumbar 4 epidural steroid injection under fluoroscopic guidance    I have encouraged them to continue:  1.  Physical therapy exercises at home as prescribed by physical therapy or from the pain clinic handout.  Continuation of these exercises every day, or multiple times per week, even when the patient has good pain relief, was stressed to the patient as a preventative measure to decrease the frequency and severity of future pain episodes.  2.  Continue pain medicines as already prescribed.  If patient not currently taking any, it is recommended to begin Acetaminophen 1000 mg po q 8 hours.  If other medicines containing Acetaminophen are currently prescribed, maintain daily dose at 3000mg.    3.  If they can tolerate NSAIDS, it is recommended to take Ibuprofen 600 mg po q 6 hours for 7 days during pain exacerbations.   Alternatively, they may substitute an NSAID of their choice (e.g. Aleve)  4.  Heat and ice to the affected area as tolerated for pain control.   5.  Low impact " exercise such as walking or water exercise was recommended to maintain overall health and aid in weight control.   6.  Follow up as needed for subsequent injections.

## 2025-03-07 NOTE — ANESTHESIA PROCEDURE NOTES
PAIN Epidural block      Patient reassessed immediately prior to procedure    Patient location during procedure: pain clinic  Indication:procedure for pain  Performed By  Anesthesiologist: Cheryl Dumont MD  Preanesthetic Checklist  Completed: patient identified, risks and benefits discussed, surgical consent, monitors and equipment checked, pre-op evaluation and timeout performed  Additional Notes  Needle placement guided by fluoroscopy and confirmed with JANIYA and contrast injection.     Diagnosis:  Post-Op Diagnosis Codes:     * Lumbar radiculopathy [M54.16]    Sedation: Versed 2 mg   sedation time: 8:18 AM to 8:26 AM  Prep:  Pt Position:prone  Sterile Tech:gloves, sterile barrier and mask  Prep:chlorhexidine gluconate and isopropyl alcohol  Monitoring:EKG, continuous pulse oximetry and blood pressure monitoring  Procedure:Sedation: yes     Approach:left paramedian  Guidance: fluoroscopy  Location:lumbar  Level:4-5 (Intralaminar)  Needle Type:Tuohy  Needle Gauge:20 G  Aspiration:negative  Medications:  Preservative Free Saline:3mL  Isovue:1mL  Depomedrol:80mg  Post Assessment:  Post-procedure: Bandaid.  Pt Tolerance:patient tolerated the procedure well with no apparent complications  Complications:no

## 2025-03-07 NOTE — DISCHARGE INSTRUCTIONS
EPIDURAL STEROID INJECTION          An epidural steroid injection is a shot of steroid medicine and numbing medicine that is given into the space between the spinal cord and the bones of the back (epidural space).  The injection helps relieve pain by an irritated or swollen nerve root.    TELL YOUR HEALTH CARE PROVIDER ABOUT:  Any allergies you have  All medicines you are taking including any over the counter medicines  Any blood disorders you have  Any surgeries you have had  Any medical conditions you have  Whether you are pregnant or may be pregnant    WHAT ARE THE RISK?  Generally, this is a safe procedure. However,problems may occur, including  Headache  Bleeding  Infection  Allergic Reaction  Nerve Damage    WHAT CAN I EXPECT AFTER THE PROCEDURE?    INJECTION SITE  Remove the Band-Aid/s after 24 hours  Check your injection site every day for signs of infection.  Check for:             Redness             Bleeding (small amt is normal)             Warmth             Pus or bad odor  Some numbness may be experienced for several hours following the procedure.  Avoid using heat on the injection site for 24 hours. You may use ice intermittently if needed by placing a         towel between your skin and the ice bag and using the ice for 20 minutes 2-3 times a day.  Do not take baths, swim or use a hot tub for 24 hours.    ACTIVITY  No strenuous activity for 24 hours then return to normal activity as tolerated.  If your leg is numb, no driving until full sensation and strength has returned.    GENERAL INSTRUCTIONS:  The injection site may feel numb, use ice with caution if numbness is present and no heat for 24 hours or until numbness is gone.   If you have numbness or weakness in your arm or leg, use those areas with caution until normal sensation returns.  It is not uncommon to notice an increase in discomfort or a change in the location of discomfort for 3-4 days after the procedure.  If discomfort is noticed  at the injection site, ice may be            applied to that area for 20 min 2-3 times a day.  Take the pain medicine your physician has prescribed or over the counter pain relievers as long as you do not have any contraindications.  If you are a diabetic, monitor your blood sugar closely.  The steroids used in your procedure may increase your blood sugar level up to 36 hours after the injection.  If your blood sugar is greater than 250, call the physician that helps you monitor your blood sugar.  Keep all follow-up visits as scheduled by your health care provider. This is important.    CONTACT OUR OFFICE IF:  You have any of these signs of infection            -Redness, swelling, or warmth around your injection site.            -Fluid or blood coming from your injection site (small amt of blood is normal)            -Pus or a bad odor from your injection site            -A fever  You develop a severe headache or a stiff neck  You lose control of your bladder or bowel movements      PAIN MANAGEMENT CENTER HOURS   Monday-Friday 7:30 am. - 4:00 pm.  For any problem related to your procedure we can be reached at [phone].  If you experience an emergency with your procedure, call [phone] or go to the emergency room.    Back Exercises  These exercises help to make your trunk and back strong. They also help to keep the lower back flexible. Doing these exercises can help to prevent or lessen pain in your lower back.  If you have back pain, try to do these exercises 2-3 times each day or as told by your doctor.  As you get better, do the exercises once each day. Repeat the exercises more often as told by your doctor.  To stop back pain from coming back, do the exercises once each day, or as told by your doctor.  Do exercises exactly as told by your doctor. Stop right away if you feel sudden pain or your pain gets worse.  Exercises  Single knee to chest  Do these steps 3-5 times in a row for each leg:  Lie on your  back on a firm bed or the floor with your legs stretched out.  Bring one knee to your chest.  Grab your knee or thigh with both hands and hold it in place.  Pull on your knee until you feel a gentle stretch in your lower back or butt.  Keep doing the stretch for 10-30 seconds.  Slowly let go of your leg and straighten it.  Pelvic tilt  Do these steps 5-10 times in a row:  Lie on your back on a firm bed or the floor with your legs stretched out.  Bend your knees so they point up to the ceiling. Your feet should be flat on the floor.  Tighten your lower belly (abdomen) muscles to press your lower back against the floor. This will make your tailbone point up to the ceiling instead of pointing down to your feet or the floor.  Stay in this position for 5-10 seconds while you gently tighten your muscles and breathe evenly.  Cat-cow  Do these steps until your lower back bends more easily:  Get on your hands and knees on a firm bed or the floor. Keep your hands under your shoulders, and keep your knees under your hips. You may put padding under your knees.  Let your head hang down toward your chest. Tighten (contract) the muscles in your belly. Point your tailbone toward the floor so your lower back becomes rounded like the back of a cat.  Stay in this position for 5 seconds.  Slowly lift your head. Let the muscles of your belly relax. Point your tailbone up toward the ceiling so your back forms a sagging arch like the back of a cow.  Stay in this position for 5 seconds.     Press-ups  Do these steps 5-10 times in a row:  Lie on your belly (face-down) on a firm bed or the floor.  Place your hands near your head, about shoulder-width apart.  While you keep your back relaxed and keep your hips on the floor, slowly straighten your arms to raise the top half of your body and lift your shoulders. Do not use your back muscles. You may change where you place your hands to make yourself more comfortable.  Stay in this position for  5 seconds. Keep your back relaxed.  Slowly return to lying flat on the floor.     Bridges  Do these steps 10 times in a row:  Lie on your back on a firm bed or the floor.  Bend your knees so they point up to the ceiling. Your feet should be flat on the floor. Your arms should be flat at your sides, next to your body.  Tighten your butt muscles and lift your butt off the floor until your waist is almost as high as your knees. If you do not feel the muscles working in your butt and the back of your thighs, slide your feet 1-2 inches (2.5-5 cm) farther away from your butt.  Stay in this position for 3-5 seconds.  Slowly lower your butt to the floor, and let your butt muscles relax.  If this exercise is too easy, try doing it with your arms crossed over your chest.  Belly crunches  Do these steps 5-10 times in a row:  Lie on your back on a firm bed or the floor with your legs stretched out.  Bend your knees so they point up to the ceiling. Your feet should be flat on the floor.  Cross your arms over your chest.  Tip your chin a little bit toward your chest, but do not bend your neck.  Tighten your belly muscles and slowly raise your chest just enough to lift your shoulder blades a tiny bit off the floor. Avoid raising your body higher than that because it can put too much stress on your lower back.  Slowly lower your chest and your head to the floor.  Back lifts  Do these steps 5-10 times in a row:  Lie on your belly (face-down) with your arms at your sides, and rest your forehead on the floor.  Tighten the muscles in your legs and your butt.  Slowly lift your chest off the floor while you keep your hips on the floor. Keep the back of your head in line with the curve in your back. Look at the floor while you do this.  Stay in this position for 3-5 seconds.  Slowly lower your chest and your face to the floor.  Contact a doctor if:  Your back pain gets a lot worse when you do an exercise.  Your back pain does not get  better within 2 hours after you exercise.  If you have any of these problems, stop doing the exercises. Do not do them again unless your doctor says it is okay.  Get help right away if:  You have sudden, very bad back pain. If this happens, stop doing the exercises. Do not do them again unless your doctor says it is okay.  This information is not intended to replace advice given to you by your health care provider. Make sure you discuss any questions you have with your health care provider.  Document Revised: 03/02/2022 Document Reviewed: 03/02/2022  [employer] Patient Education © 2024 [employer]    What is [employer]?  [employer] is a fitness and wellness program offered at no additional cost to seniors 65+ on eligible Medicare plans that helps you get active, get fit, and connect with others.  The program is designed for all levels and abilities and provides access to online and in-person classes, over 15,000 fitness locations, and health & wellness discounts.  Before starting any exercise program, consult with your primary care provider.  For additional information and to check eligibility:  tools.M-Audio                      EPIDURAL STEROID INJECTION          An epidural steroid injection is a shot of steroid medicine and numbing medicine that is given into the space between the spinal cord and the bones of the back (epidural space).  The injection helps relieve pain by an irritated or swollen nerve root.    TELL YOUR HEALTH CARE PROVIDER ABOUT:  Any allergies you have  All medicines you are taking including any over the counter medicines  Any blood disorders you have  Any surgeries you have had  Any medical conditions you have  Whether you are pregnant or may be pregnant    WHAT ARE THE RISK?  Generally, this is a safe procedure. However,problems may occur, including  Headache  Bleeding  Infection  Allergic Reaction  Nerve Damage    WHAT CAN I EXPECT AFTER THE PROCEDURE?    INJECTION  SITE  Remove the Band-Aid/s after 24 hours  Check your injection site every day for signs of infection.  Check for:             Redness             Bleeding (small amt is normal)             Warmth             Pus or bad odor  Some numbness may be experienced for several hours following the procedure.  Avoid using heat on the injection site for 24 hours. You may use ice intermittently if needed by placing a         towel between your skin and the ice bag and using the ice for 20 minutes 2-3 times a day.  Do not take baths, swim or use a hot tub for 24 hours.    ACTIVITY  No strenuous activity for 24 hours then return to normal activity as tolerated.  If your leg is numb, no driving until full sensation and strength has returned.    GENERAL INSTRUCTIONS:  The injection site may feel numb, use ice with caution if numbness is present and no heat for 24 hours or until numbness is gone.   If you have numbness or weakness in your arm or leg, use those areas with caution until normal sensation returns.  It is not uncommon to notice an increase in discomfort or a change in the location of discomfort for 3-4 days after the procedure.  If discomfort is noticed at the injection site, ice may be            applied to that area for 20 min 2-3 times a day.  Take the pain medicine your physician has prescribed or over the counter pain relievers as long as you do not have any contraindications.  If you are a diabetic, monitor your blood sugar closely.  The steroids used in your procedure may increase your blood sugar level up to 36 hours after the injection.  If your blood sugar is greater than 250, call the physician that helps you monitor your blood sugar.  Keep all follow-up visits as scheduled by your health care provider. This is important.    CONTACT OUR OFFICE IF:  You have any of these signs of infection            -Redness, swelling, or warmth around your injection site.            -Fluid or blood coming from your  injection site (small amt of blood is normal)            -Pus or a bad odor from your injection site            -A fever  You develop a severe headache or a stiff neck  You lose control of your bladder or bowel movements      PAIN MANAGEMENT CENTER HOURS   Monday-Friday 7:30 am. - 4:00 pm.  For any problem related to your procedure we can be reached at [phone].  If you experience an emergency with your procedure, call [phone] or go to the emergency room.    Back Exercises  These exercises help to make your trunk and back strong. They also help to keep the lower back flexible. Doing these exercises can help to prevent or lessen pain in your lower back.  If you have back pain, try to do these exercises 2-3 times each day or as told by your doctor.  As you get better, do the exercises once each day. Repeat the exercises more often as told by your doctor.  To stop back pain from coming back, do the exercises once each day, or as told by your doctor.  Do exercises exactly as told by your doctor. Stop right away if you feel sudden pain or your pain gets worse.  Exercises  Single knee to chest  Do these steps 3-5 times in a row for each leg:  Lie on your back on a firm bed or the floor with your legs stretched out.  Bring one knee to your chest.  Grab your knee or thigh with both hands and hold it in place.  Pull on your knee until you feel a gentle stretch in your lower back or butt.  Keep doing the stretch for 10-30 seconds.  Slowly let go of your leg and straighten it.  Pelvic tilt  Do these steps 5-10 times in a row:  Lie on your back on a firm bed or the floor with your legs stretched out.  Bend your knees so they point up to the ceiling. Your feet should be flat on the floor.  Tighten your lower belly (abdomen) muscles to press your lower back against the floor. This will make your tailbone point up to the ceiling instead of pointing down to your feet or the floor.  Stay in this position for 5-10 seconds  while you gently tighten your muscles and breathe evenly.  Cat-cow  Do these steps until your lower back bends more easily:  Get on your hands and knees on a firm bed or the floor. Keep your hands under your shoulders, and keep your knees under your hips. You may put padding under your knees.  Let your head hang down toward your chest. Tighten (contract) the muscles in your belly. Point your tailbone toward the floor so your lower back becomes rounded like the back of a cat.  Stay in this position for 5 seconds.  Slowly lift your head. Let the muscles of your belly relax. Point your tailbone up toward the ceiling so your back forms a sagging arch like the back of a cow.  Stay in this position for 5 seconds.     Press-ups  Do these steps 5-10 times in a row:  Lie on your belly (face-down) on a firm bed or the floor.  Place your hands near your head, about shoulder-width apart.  While you keep your back relaxed and keep your hips on the floor, slowly straighten your arms to raise the top half of your body and lift your shoulders. Do not use your back muscles. You may change where you place your hands to make yourself more comfortable.  Stay in this position for 5 seconds. Keep your back relaxed.  Slowly return to lying flat on the floor.     Bridges  Do these steps 10 times in a row:  Lie on your back on a firm bed or the floor.  Bend your knees so they point up to the ceiling. Your feet should be flat on the floor. Your arms should be flat at your sides, next to your body.  Tighten your butt muscles and lift your butt off the floor until your waist is almost as high as your knees. If you do not feel the muscles working in your butt and the back of your thighs, slide your feet 1-2 inches (2.5-5 cm) farther away from your butt.  Stay in this position for 3-5 seconds.  Slowly lower your butt to the floor, and let your butt muscles relax.  If this exercise is too easy, try doing it with your arms crossed over your  chest.  Belly crunches  Do these steps 5-10 times in a row:  Lie on your back on a firm bed or the floor with your legs stretched out.  Bend your knees so they point up to the ceiling. Your feet should be flat on the floor.  Cross your arms over your chest.  Tip your chin a little bit toward your chest, but do not bend your neck.  Tighten your belly muscles and slowly raise your chest just enough to lift your shoulder blades a tiny bit off the floor. Avoid raising your body higher than that because it can put too much stress on your lower back.  Slowly lower your chest and your head to the floor.  Back lifts  Do these steps 5-10 times in a row:  Lie on your belly (face-down) with your arms at your sides, and rest your forehead on the floor.  Tighten the muscles in your legs and your butt.  Slowly lift your chest off the floor while you keep your hips on the floor. Keep the back of your head in line with the curve in your back. Look at the floor while you do this.  Stay in this position for 3-5 seconds.  Slowly lower your chest and your face to the floor.  Contact a doctor if:  Your back pain gets a lot worse when you do an exercise.  Your back pain does not get better within 2 hours after you exercise.  If you have any of these problems, stop doing the exercises. Do not do them again unless your doctor says it is okay.  Get help right away if:  You have sudden, very bad back pain. If this happens, stop doing the exercises. Do not do them again unless your doctor says it is okay.  This information is not intended to replace advice given to you by your health care provider. Make sure you discuss any questions you have with your health care provider.  Document Revised: 03/02/2022 Document Reviewed: 03/02/2022  [employer] Patient Education © 2024 [employer]    What is [employer]?  [employer] is a fitness and wellness program offered at no additional cost to seniors 65+ on eligible Medicare plans that helps you  get active, get fit, and connect with others.  The program is designed for all levels and abilities and provides access to online and in-person classes, over 15,000 fitness locations, and health & wellness discounts.  Before starting any exercise program, consult with your primary care provider.  For additional information and to check eligibility:  tools.uTaP

## 2025-04-29 ENCOUNTER — OFFICE VISIT (OUTPATIENT)
Dept: OBSTETRICS AND GYNECOLOGY | Age: 60
End: 2025-04-29
Payer: COMMERCIAL

## 2025-04-29 VITALS
DIASTOLIC BLOOD PRESSURE: 70 MMHG | SYSTOLIC BLOOD PRESSURE: 114 MMHG | WEIGHT: 141 LBS | BODY MASS INDEX: 24.98 KG/M2 | HEIGHT: 63 IN

## 2025-04-29 DIAGNOSIS — Z12.4 ENCOUNTER FOR PAPANICOLAOU SMEAR FOR CERVICAL CANCER SCREENING: ICD-10-CM

## 2025-04-29 DIAGNOSIS — N95.1 MENOPAUSAL SYMPTOMS: ICD-10-CM

## 2025-04-29 DIAGNOSIS — Z01.419 WELL WOMAN EXAM WITH ROUTINE GYNECOLOGICAL EXAM: Primary | ICD-10-CM

## 2025-04-29 DIAGNOSIS — Z13.820 OSTEOPOROSIS SCREENING: ICD-10-CM

## 2025-04-29 DIAGNOSIS — Z11.51 SCREENING FOR HPV (HUMAN PAPILLOMAVIRUS): ICD-10-CM

## 2025-04-29 DIAGNOSIS — R63.5 WEIGHT GAIN: ICD-10-CM

## 2025-04-29 DIAGNOSIS — Z12.31 SCREENING MAMMOGRAM FOR BREAST CANCER: Primary | ICD-10-CM

## 2025-04-29 RX ORDER — PROGESTERONE 100 MG/1
100 CAPSULE ORAL DAILY
Qty: 30 CAPSULE | Refills: 3 | Status: SHIPPED | OUTPATIENT
Start: 2025-04-29

## 2025-04-29 RX ORDER — ESTRADIOL 0.05 MG/D
1 PATCH, EXTENDED RELEASE TRANSDERMAL 2 TIMES WEEKLY
Qty: 8 EACH | Refills: 3 | Status: SHIPPED | OUTPATIENT
Start: 2025-05-01

## 2025-04-29 NOTE — PROGRESS NOTES
Subjective     Chief Complaint   Patient presents with    Annual Exam     Annual exam : c/o weight gain.  She would like to go over compounded pharmacy medication.   Last pap 11/24/2022 neg/neg  M/g 12/19/2024  [facility] neg 2024  Colonoscopy 2024       History of Present Illness    Yu Moise is a 59 y.o. No obstetric history on file. who presents for annual exam.    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        Menstrual History:     No LMP recorded. Patient is postmenopausal.         Current contraception: post menopausal status  History of abnormal Pap smear: no  Received Gardasil immunization: no  Perform regular self breast exam: yes - mthly  Family history of uterine or ovarian cancer: no  Family History of colon cancer: no  Family history of breast cancer: yes - mom with breast cancer    Mammogram: up to date.  Colonoscopy: up to date.  DEXA: ordered.    Exercise: moderately active  Calcium/Vitamin D: adequate intake    The following portions of the patient's history were reviewed and updated as appropriate: allergies, current medications, past family history, past medical history, past social history, past surgical history, and problem list.    Review of Systems   Constitutional:  Positive for unexpected weight change.   Genitourinary:         Decreased libido  "  Psychiatric/Behavioral:          Increased stress   All other systems reviewed and are negative.      Review of Systems   Constitutional: Negative for fatigue.   Respiratory: Negative for shortness of breath.    Gastrointestinal: Negative for abdominal pain.   Genitourinary: Negative for dysuria.   Neurological: Negative for headaches.   Psychiatric/Behavioral: Negative for dysphoric mood.         Objective   Physical Exam    /70   Ht 160 cm (63\")   Wt 64 kg (141 lb)   BMI 24.98 kg/m²   General:   alert, comfortable, and no distress   Heart: regular rate and rhythm   Lungs: clear to auscultation bilaterally   Breast: normal appearance, no masses or tenderness, Inspection negative, No nipple retraction or dimpling, No nipple discharge or bleeding, No axillary or supraclavicular adenopathy, Normal to palpation without dominant masses   Neck: no adenopathy and no carotid bruit   Abdomen: Not performed today   CVA: Not performed today   Pelvis: External genitalia: normal general appearance  Vaginal: normal mucosa without prolapse or lesions  Cervix: normal appearance and thin prep PAP obtained  Adnexa: normal bimanual exam  Uterus: normal single, nontender   Extremities: Not performed today   Neurologic: negative   Psychiatric: Normal affect, judgement, and mood     Assessment & Plan   Diagnoses and all orders for this visit:    1. Well woman exam with routine gynecological exam (Primary)    2. Osteoporosis screening  -     DEXA Bone Density Axial    3. Encounter for Papanicolaou smear for cervical cancer screening  -     IGP, Aptima HPV, Rfx 16 / 18,45    4. Screening for HPV (human papillomavirus)  -     IGP, Aptima HPV, Rfx 16 / 18,45    5. Menopausal symptoms    6. Weight gain  -     Ambulatory Referral to Nutrition Services    Other orders  -     estradiol (Vivelle-Dot) 0.05 MG/24HR patch; Place 1 patch on the skin as directed by provider 2 (Two) Times a Week.  Dispense: 8 each; Refill: 3  -     " Progesterone (Prometrium) 100 MG capsule; Take 1 capsule by mouth Daily.  Dispense: 30 capsule; Refill: 3        All questions answered.  Breast self exam technique reviewed and patient encouraged to perform self-exam monthly.  Discussed healthy lifestyle modifications.  Recommended 30 minutes of aerobic exercise five times per week.  Discussed calcium needs to prevent osteoporosis.    Pap done  DEXA and mammogram will be done in December  Change to prescribed HT  Sent in topical testosterone to use clitorally as well

## 2025-05-01 NOTE — PROGRESS NOTES
Subjective   Patient ID: Yu Moise is a 59 y.o. female is here today for follow-up for facet arthritis of lumbar region.    Imaging    - MRI Lumbar Spine Without Contrast completed 12/04/2024    Pt reports right lower back & hip.    History of Present Illness    Last seen in the office on January 30, 2025 with new thoracic and lumbar MRI imaging.  She was having acute left low back and leg pain.  She also reported chronic right hip and leg pain which had been manageable with conservative treatment.  She has been getting lumbar epidural injections to treat the low back and leg pain.  She was also started on Robaxin.    Today, she reports that she is doing much better after receiving epidural injections.  Her low back and leg symptoms have  improved to the point that she has only minimal discomfort.  A couple weeks ago she was painting on her stairs and stumbled down a few steps hitting the right side of her hip and low back.  She has had some residual pain in this area ever since.  She denies any weakness in either leg, as well as any balance or gait issues.  She continues to have some discomfort in the SI joint on the right side.  She has resumed doing yoga and is overall remaining pretty active.  She currently denies pain down either leg, numbness or tingling.  In addition to the epidural she takes over-the-counter inflammatories as needed.    She presents unaccompanied.      The following portions of the patient's history were reviewed and updated as appropriate: allergies, current medications, past family history, past medical history, past social history, past surgical history, and problem list.    Review of Systems   Constitutional:  Negative for fatigue and fever.   Eyes:  Negative for visual disturbance.   Gastrointestinal:  Negative for nausea and vomiting.   Genitourinary:  Negative for difficulty urinating.   Musculoskeletal:  Positive for back pain. Negative for gait problem, neck pain and neck  stiffness.   Neurological:  Negative for dizziness, weakness, light-headedness, numbness and headaches.   Psychiatric/Behavioral:  Negative for confusion and decreased concentration.        /80 (BP Location: Right arm, Patient Position: Sitting, Cuff Size: Adult)   Pulse 68   Temp 98.2 °F (36.8 °C) (Temporal)   Wt 65.4 kg (144 lb 3.2 oz)   SpO2 98%   BMI 25.54 kg/m²       Objective     Vitals:    05/06/25 1339   BP: 116/80   BP Location: Right arm   Patient Position: Sitting   Cuff Size: Adult   Pulse: 68   Temp: 98.2 °F (36.8 °C)   TempSrc: Temporal   SpO2: 98%   Weight: 65.4 kg (144 lb 3.2 oz)   PainSc: 2    PainLoc: Back     Body mass index is 25.54 kg/m².      Physical Exam  Vitals reviewed.   Constitutional:       Appearance: Normal appearance.   HENT:      Head: Atraumatic.   Pulmonary:      Effort: Pulmonary effort is normal.   Musculoskeletal:         General: Tenderness (Minimal tenderness in the right lateral hip also tender in the right SI joint) present. Normal range of motion.      Right lower leg: No edema.      Left lower leg: No edema.      Comments: Full lumbar range of motion without pain  Strength is equal and intact bilateral lower extremities with normal muscle bulk and tone positive Chas's on the right     Skin:     General: Skin is warm and dry.   Neurological:      Mental Status: She is alert and oriented to person, place, and time.      GCS: GCS eye subscore is 4. GCS verbal subscore is 5. GCS motor subscore is 6.      Sensory: No sensory deficit.      Motor: No weakness or tremor.      Gait: Gait normal.      Deep Tendon Reflexes:      Reflex Scores:       Patellar reflexes are 2+ on the right side and 2+ on the left side.       Achilles reflexes are 2+ on the right side and 2+ on the left side.     Comments: Negative straight leg raise   normal sensation to soft touch bilateral lower extremities  Gait is fluid, stable and upright, nonantalgic   Psychiatric:         Mood and  Affect: Mood normal.       Neurological Exam  Mental Status  Alert. Oriented to person, place, and time.    Reflexes                                            Right                      Left  Patellar                                2+                         2+  Achilles                                2+                         2+    Coordination  No tremor    Gait   Normal gait.          Assessment & Plan   Independent Review of Radiographic Studies:      I personally reviewed the images from the following studies.    No new imaging     pain management notes reviewed        Medical Decision Making:      Returns to the office today for ongoing follow-up with history of acute on chronic low back intermittent hip and leg pain.  Exam as noted above, no red flags.  She is intact on exam with normal strength, sensation and reflexes.  She does have some increased right sided low back and hip pain after her recent fall but I think this is more soft tissue tenderness in origin.  We will continue to monitor this over the next couple of weeks and if it changes we can always reassess this area.  She has no restrictions and is fine to increase her yoga and overall activity level as tolerated.  We will continue to order the lumbar epidurals as needed.  She has another injection scheduled, but that will be her third.  After that she will need a new order which I will be happy to provide.  As long as she continues to do and feel well we will see her back on an as-needed basis.  She is to call at anytime with any questions or concerns.    Plan: Continue lumbar epidural injections, return to office as needed     Diagnoses and all orders for this visit:    1. Lumbar disc herniation with radiculopathy (Primary)    2. Degeneration of intervertebral disc of lumbar region with discogenic back pain and lower extremity pain      Return if symptoms worsen or fail to improve.

## 2025-05-02 LAB
CYTOLOGIST CVX/VAG CYTO: NORMAL
CYTOLOGY CVX/VAG DOC CYTO: NORMAL
CYTOLOGY CVX/VAG DOC THIN PREP: NORMAL
DX ICD CODE: NORMAL
HPV GENOTYPE REFLEX: NORMAL
HPV I/H RISK 4 DNA CVX QL PROBE+SIG AMP: NEGATIVE
Lab: NORMAL
OTHER STN SPEC: NORMAL
SERVICE CMNT-IMP: NORMAL
STAT OF ADQ CVX/VAG CYTO-IMP: NORMAL

## 2025-05-06 ENCOUNTER — OFFICE VISIT (OUTPATIENT)
Dept: NEUROSURGERY | Facility: CLINIC | Age: 60
End: 2025-05-06
Payer: COMMERCIAL

## 2025-05-06 VITALS
DIASTOLIC BLOOD PRESSURE: 80 MMHG | OXYGEN SATURATION: 98 % | WEIGHT: 144.2 LBS | BODY MASS INDEX: 25.54 KG/M2 | HEART RATE: 68 BPM | TEMPERATURE: 98.2 F | SYSTOLIC BLOOD PRESSURE: 116 MMHG

## 2025-05-06 DIAGNOSIS — M51.16 LUMBAR DISC HERNIATION WITH RADICULOPATHY: Primary | ICD-10-CM

## 2025-05-06 DIAGNOSIS — M51.362 DEGENERATION OF INTERVERTEBRAL DISC OF LUMBAR REGION WITH DISCOGENIC BACK PAIN AND LOWER EXTREMITY PAIN: ICD-10-CM

## 2025-05-06 RX ORDER — PREDNISONE 10 MG/1
TABLET ORAL AS NEEDED
COMMUNITY
Start: 2025-04-08

## 2025-05-19 RX ORDER — METHOCARBAMOL 750 MG/1
750 TABLET, FILM COATED ORAL EVERY 6 HOURS PRN
Qty: 40 TABLET | Refills: 0 | Status: SHIPPED | OUTPATIENT
Start: 2025-05-19 | End: 2025-05-20

## 2025-05-20 RX ORDER — METHOCARBAMOL 750 MG/1
750 TABLET, FILM COATED ORAL EVERY 6 HOURS PRN
Qty: 40 TABLET | Refills: 0 | Status: SHIPPED | OUTPATIENT
Start: 2025-05-20

## 2025-06-16 ENCOUNTER — HOSPITAL ENCOUNTER (OUTPATIENT)
Dept: GENERAL RADIOLOGY | Facility: HOSPITAL | Age: 60
Discharge: HOME | End: 2025-06-16
Payer: COMMERCIAL

## 2025-06-16 ENCOUNTER — ANESTHESIA (OUTPATIENT)
Dept: PAIN MEDICINE | Facility: HOSPITAL | Age: 60
End: 2025-06-16
Payer: COMMERCIAL

## 2025-06-16 ENCOUNTER — ANESTHESIA EVENT (OUTPATIENT)
Dept: PAIN MEDICINE | Facility: HOSPITAL | Age: 60
End: 2025-06-16
Payer: COMMERCIAL

## 2025-06-16 ENCOUNTER — HOSPITAL ENCOUNTER (OUTPATIENT)
Dept: PAIN MEDICINE | Facility: HOSPITAL | Age: 60
Discharge: HOME | End: 2025-06-16
Payer: COMMERCIAL

## 2025-06-16 VITALS
TEMPERATURE: 97.6 F | OXYGEN SATURATION: 96 % | DIASTOLIC BLOOD PRESSURE: 76 MMHG | RESPIRATION RATE: 18 BRPM | SYSTOLIC BLOOD PRESSURE: 103 MMHG | HEART RATE: 67 BPM

## 2025-06-16 DIAGNOSIS — R52 PAIN: ICD-10-CM

## 2025-06-16 DIAGNOSIS — M54.16 LUMBAR RADICULOPATHY: ICD-10-CM

## 2025-06-16 PROCEDURE — 25510000001 IOPAMIDOL 41 % SOLUTION: Performed by: ANESTHESIOLOGY

## 2025-06-16 PROCEDURE — 77003 FLUOROGUIDE FOR SPINE INJECT: CPT

## 2025-06-16 PROCEDURE — 25010000002 MIDAZOLAM PER 1 MG: Performed by: ANESTHESIOLOGY

## 2025-06-16 PROCEDURE — 25010000002 METHYLPREDNISOLONE PER 80 MG: Performed by: ANESTHESIOLOGY

## 2025-06-16 RX ORDER — FENTANYL CITRATE 50 UG/ML
50 INJECTION, SOLUTION INTRAMUSCULAR; INTRAVENOUS ONCE
Status: DISCONTINUED | OUTPATIENT
Start: 2025-06-16 | End: 2025-06-17 | Stop reason: HOSPADM

## 2025-06-16 RX ORDER — LIDOCAINE HYDROCHLORIDE 10 MG/ML
1 INJECTION, SOLUTION INFILTRATION; PERINEURAL ONCE
Status: DISCONTINUED | OUTPATIENT
Start: 2025-06-16 | End: 2025-06-17 | Stop reason: HOSPADM

## 2025-06-16 RX ORDER — IOPAMIDOL 408 MG/ML
10 INJECTION, SOLUTION INTRATHECAL
Status: COMPLETED | OUTPATIENT
Start: 2025-06-16 | End: 2025-06-16

## 2025-06-16 RX ORDER — OMEPRAZOLE 40 MG/1
40 CAPSULE, DELAYED RELEASE ORAL DAILY
COMMUNITY
Start: 2025-06-11 | End: 2026-06-11

## 2025-06-16 RX ORDER — DICYCLOMINE HCL 20 MG
20 TABLET ORAL
COMMUNITY
Start: 2025-06-11

## 2025-06-16 RX ORDER — TRIAMCINOLONE ACETONIDE 1 MG/G
CREAM TOPICAL
COMMUNITY
Start: 2025-05-07

## 2025-06-16 RX ORDER — METHYLPREDNISOLONE ACETATE 80 MG/ML
80 INJECTION, SUSPENSION INTRA-ARTICULAR; INTRALESIONAL; INTRAMUSCULAR; SOFT TISSUE ONCE
Status: COMPLETED | OUTPATIENT
Start: 2025-06-16 | End: 2025-06-16

## 2025-06-16 RX ORDER — MIDAZOLAM HYDROCHLORIDE 1 MG/ML
1 INJECTION, SOLUTION INTRAMUSCULAR; INTRAVENOUS ONCE AS NEEDED
Status: COMPLETED | OUTPATIENT
Start: 2025-06-16 | End: 2025-06-16

## 2025-06-16 RX ADMIN — MIDAZOLAM HYDROCHLORIDE 2 MG: 1 INJECTION, SOLUTION INTRAMUSCULAR; INTRAVENOUS at 08:25

## 2025-06-16 RX ADMIN — METHYLPREDNISOLONE ACETATE 80 MG: 80 INJECTION, SUSPENSION INTRA-ARTICULAR; INTRALESIONAL; INTRAMUSCULAR; SOFT TISSUE at 08:29

## 2025-06-16 RX ADMIN — IOPAMIDOL 10 ML: 408 INJECTION, SOLUTION INTRATHECAL at 08:28

## 2025-06-16 NOTE — ANESTHESIA PROCEDURE NOTES
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.73]     * Lumbar radiculopathy [M54.16]    Prep:  Pt Position:prone  Sterile Tech:sterile barrier, mask and gloves  Prep:chlorhexidine gluconate and isopropyl alcohol  Monitoring:blood pressure monitoring, continuous pulse oximetry and EKG  Procedure:Sedation: yes     Approach:right paramedian  Guidance: fluoroscopy  Location:lumbar  Level:4-5  Needle Type:Tuohy  Needle Gauge:20 G  Aspiration:negative  Test Dose:negative  Medications:  Isovue:2mL  Comments:Sedation time was 825 until 832Depomedrol:80mg  Post Assessment:  Pt Tolerance:patient tolerated the procedure well with no apparent complications  Complications:no

## 2025-06-16 NOTE — DISCHARGE INSTRUCTIONS
EPIDURAL STEROID INJECTION          An epidural steroid injection is a shot of steroid medicine and numbing medicine that is given into the space between the spinal cord and the bones of the back (epidural space).  The injection helps relieve pain by an irritated or swollen nerve root.    TELL YOUR HEALTH CARE PROVIDER ABOUT:  Any allergies you have  All medicines you are taking including any over the counter medicines  Any blood disorders you have  Any surgeries you have had  Any medical conditions you have  Whether you are pregnant or may be pregnant    WHAT ARE THE RISK?  Generally, this is a safe procedure. However,problems may occur, including  Headache  Bleeding  Infection  Allergic Reaction  Nerve Damage    WHAT CAN I EXPECT AFTER THE PROCEDURE?    INJECTION SITE  Remove the Band-Aid/s after 24 hours  Check your injection site every day for signs of infection.  Check for:             Redness             Bleeding (small amt is normal)             Warmth             Pus or bad odor  Some numbness may be experienced for several hours following the procedure.  Avoid using heat on the injection site for 24 hours. You may use ice intermittently if needed by placing a         towel between your skin and the ice bag and using the ice for 20 minutes 2-3 times a day.  Do not take baths, swim or use a hot tub for 24 hours.    ACTIVITY  No strenuous activity for 24 hours then return to normal activity as tolerated.  If your leg is numb, no driving until full sensation and strength has returned.    GENERAL INSTRUCTIONS:  The injection site may feel numb, use ice with caution if numbness is present and no heat for 24 hours or until numbness is gone.   If you have numbness or weakness in your arm or leg, use those areas with caution until normal sensation returns.  It is not uncommon to notice an increase in discomfort or a change in the location of discomfort for 3-4 days after the procedure.  If discomfort is noticed  at the injection site, ice may be            applied to that area for 20 min 2-3 times a day.  Take the pain medicine your physician has prescribed or over the counter pain relievers as long as you do not have any contraindications.  If you are a diabetic, monitor your blood sugar closely.  The steroids used in your procedure may increase your blood sugar level up to 36 hours after the injection.  If your blood sugar is greater than 250, call the physician that helps you monitor your blood sugar.  Keep all follow-up visits as scheduled by your health care provider. This is important.    CONTACT OUR OFFICE IF:  You have any of these signs of infection            -Redness, swelling, or warmth around your injection site.            -Fluid or blood coming from your injection site (small amt of blood is normal)            -Pus or a bad odor from your injection site            -A fever  You develop a severe headache or a stiff neck  You lose control of your bladder or bowel movements      PAIN MANAGEMENT CENTER HOURS   Monday-Friday 7:30 am. - 4:00 pm.  For any problem related to your procedure we can be reached at [phone].  If you experience an emergency with your procedure, call [phone] or go to the emergency room.    Back Exercises  These exercises help to make your trunk and back strong. They also help to keep the lower back flexible. Doing these exercises can help to prevent or lessen pain in your lower back.  If you have back pain, try to do these exercises 2-3 times each day or as told by your doctor.  As you get better, do the exercises once each day. Repeat the exercises more often as told by your doctor.  To stop back pain from coming back, do the exercises once each day, or as told by your doctor.  Do exercises exactly as told by your doctor. Stop right away if you feel sudden pain or your pain gets worse.  Exercises  Single knee to chest  Do these steps 3-5 times in a row for each leg:  Lie on your  back on a firm bed or the floor with your legs stretched out.  Bring one knee to your chest.  Grab your knee or thigh with both hands and hold it in place.  Pull on your knee until you feel a gentle stretch in your lower back or butt.  Keep doing the stretch for 10-30 seconds.  Slowly let go of your leg and straighten it.  Pelvic tilt  Do these steps 5-10 times in a row:  Lie on your back on a firm bed or the floor with your legs stretched out.  Bend your knees so they point up to the ceiling. Your feet should be flat on the floor.  Tighten your lower belly (abdomen) muscles to press your lower back against the floor. This will make your tailbone point up to the ceiling instead of pointing down to your feet or the floor.  Stay in this position for 5-10 seconds while you gently tighten your muscles and breathe evenly.  Cat-cow  Do these steps until your lower back bends more easily:  Get on your hands and knees on a firm bed or the floor. Keep your hands under your shoulders, and keep your knees under your hips. You may put padding under your knees.  Let your head hang down toward your chest. Tighten (contract) the muscles in your belly. Point your tailbone toward the floor so your lower back becomes rounded like the back of a cat.  Stay in this position for 5 seconds.  Slowly lift your head. Let the muscles of your belly relax. Point your tailbone up toward the ceiling so your back forms a sagging arch like the back of a cow.  Stay in this position for 5 seconds.     Press-ups  Do these steps 5-10 times in a row:  Lie on your belly (face-down) on a firm bed or the floor.  Place your hands near your head, about shoulder-width apart.  While you keep your back relaxed and keep your hips on the floor, slowly straighten your arms to raise the top half of your body and lift your shoulders. Do not use your back muscles. You may change where you place your hands to make yourself more comfortable.  Stay in this position for  5 seconds. Keep your back relaxed.  Slowly return to lying flat on the floor.     Bridges  Do these steps 10 times in a row:  Lie on your back on a firm bed or the floor.  Bend your knees so they point up to the ceiling. Your feet should be flat on the floor. Your arms should be flat at your sides, next to your body.  Tighten your butt muscles and lift your butt off the floor until your waist is almost as high as your knees. If you do not feel the muscles working in your butt and the back of your thighs, slide your feet 1-2 inches (2.5-5 cm) farther away from your butt.  Stay in this position for 3-5 seconds.  Slowly lower your butt to the floor, and let your butt muscles relax.  If this exercise is too easy, try doing it with your arms crossed over your chest.  Belly crunches  Do these steps 5-10 times in a row:  Lie on your back on a firm bed or the floor with your legs stretched out.  Bend your knees so they point up to the ceiling. Your feet should be flat on the floor.  Cross your arms over your chest.  Tip your chin a little bit toward your chest, but do not bend your neck.  Tighten your belly muscles and slowly raise your chest just enough to lift your shoulder blades a tiny bit off the floor. Avoid raising your body higher than that because it can put too much stress on your lower back.  Slowly lower your chest and your head to the floor.  Back lifts  Do these steps 5-10 times in a row:  Lie on your belly (face-down) with your arms at your sides, and rest your forehead on the floor.  Tighten the muscles in your legs and your butt.  Slowly lift your chest off the floor while you keep your hips on the floor. Keep the back of your head in line with the curve in your back. Look at the floor while you do this.  Stay in this position for 3-5 seconds.  Slowly lower your chest and your face to the floor.  Contact a doctor if:  Your back pain gets a lot worse when you do an exercise.  Your back pain does not get  better within 2 hours after you exercise.  If you have any of these problems, stop doing the exercises. Do not do them again unless your doctor says it is okay.  Get help right away if:  You have sudden, very bad back pain. If this happens, stop doing the exercises. Do not do them again unless your doctor says it is okay.  This information is not intended to replace advice given to you by your health care provider. Make sure you discuss any questions you have with your health care provider.  Document Revised: 03/02/2022 Document Reviewed: 03/02/2022  [employer] Patient Education © 2024 [employer]    What is [employer]?  [employer] is a fitness and wellness program offered at no additional cost to seniors 65+ on eligible Medicare plans that helps you get active, get fit, and connect with others.  The program is designed for all levels and abilities and provides access to online and in-person classes, over 15,000 fitness locations, and health & wellness discounts.  Before starting any exercise program, consult with your primary care provider.  For additional information and to check eligibility:  tools.[employer]

## 2025-06-16 NOTE — H&P
[facility]    History and Physical    Patient Name: Yu Moise  :  1965  MRN:  [id number]  Date of Admission: 2025    Subjective     Patient is a 59 y.o. female presents with chief complaint of chronic low back, hips: right, and leg: right pain.  Onset of symptoms was gradual starting several months ago.  Symptoms are associated/aggravated by nothing in particular, activity, or walking for more than a few minutes. Symptoms improve with injection    The following portions of the patients history were reviewed and updated as appropriate: current medications, allergies, past medical history, past surgical history, past family history, past social history, and problem list    She reports low back pain with radicular symptoms onto the top of her right buttock that radiates down the posterior lateral aspect of her right leg and down to the lateral aspect of her ankle.  When she ambulates she also notices pain that goes into her right groin.  She had a previous epidural steroid injection at L4-5 which did help her a great deal.  She reports about 80% relief of her discomfort however it is coming back.  She says her pain is reliably brought on by ambulation which she used to walk quite frequently.  She says she is done physical therapy and it helps modestly.    MRI reportINDINGS:    Vertebrae:  No acute fracture.  Minimal anterolisthesis of L3 on L4   from facet arthrosis.    Interspaces: Mild disc height loss L3-4 and L4-5.    Spinal cord:  No abnormal signal at the conus.    Soft tissues:  Unremarkable.      DISCS SPINAL CANAL NEURAL FORAMINA:     Spinal canal: No significant central canal stenosis at any level.    Minimal disc bulge L3-4 and L4-5     Neural foramina: Moderate to severe L4-5 and moderate right L3-4   foraminal stenosis.     Facet joints: Increased fluid within the left L4-5 facet joint, cannot   exclude synovitis.     IMPRESSION:         No acute findings.  Degenerative changes  as above.            Objective     Past Medical History:   Past Medical History:   Diagnosis Date   • Anxiety    • Depression    • Gestational hypertension 1998   • Hypertension    • Low back pain    • Meniere's disease    • Peripheral neuropathy    • PMS (premenstrual syndrome)    • Trauma     In my twenties   • Urinary tract infection    • Varicella      Past Surgical History:   Past Surgical History:   Procedure Laterality Date   • ENDOMETRIAL ABLATION     • EPIDURAL BLOCK     • WISDOM TOOTH EXTRACTION       Family History:   Family History   Problem Relation Age of Onset   • Breast cancer Mother      Social History:   Social History     Socioeconomic History   • Marital status:    Tobacco Use   • Smoking status: Never     Passive exposure: Never   • Smokeless tobacco: Never   Vaping Use   • Vaping status: Never Used   Substance and Sexual Activity   • Alcohol use: No   • Drug use: No   • Sexual activity: Yes     Partners: Male     Birth control/protection: Post-menopausal       Vital Signs Range for the last 24 hours  Temperature: Temp:  [36.4 °C (97.6 °F)] 36.4 °C (97.6 °F)   Temp Source: Temp src: Oral   BP: BP: (118)/(75) 118/75   Pulse: Heart Rate:  [75] 75   Respirations: Resp:  [16] 16   SPO2: SpO2:  [97 %] 97 %   O2 Amount (l/min):     O2 Devices Device (Oxygen Therapy): room air   Weight:           --------------------------------------------------------------------------------    Current Outpatient Medications   Medication Sig Dispense Refill   • dicyclomine (BENTYL) 20 MG tablet Take 1 tablet by mouth.     • omeprazole (priLOSEC) 40 MG capsule Take 1 capsule by mouth Daily.     • triamcinolone (KENALOG) 0.1 % cream      • cetirizine (zyrTEC) 10 MG tablet Take 1 tablet by mouth As Needed for Allergies.     • estradiol (Vivelle-Dot) 0.05 MG/24HR patch Place 1 patch on the skin as directed by provider 2 (Two) Times a Week. 8 each 3   • fluticasone (FLONASE) 50 MCG/ACT nasal spray Administer 2  sprays into the nostril(s) as directed by provider As Needed.     • hydroCHLOROthiazide (HYDRODIURIL) 25 MG tablet Take 1 tablet by mouth Every Morning.     • Magnesium 100 MG capsule Take 100 mg by mouth Every Night.     • Magnesium Oxide -Mg Supplement 400 (240 Mg) MG tablet Take 1 tablet by mouth Daily. (Patient not taking: Reported on 5/6/2025)     • meclizine (ANTIVERT) 25 MG tablet Take 1 tablet by mouth 3 (Three) Times a Day As Needed.     • methocarbamol (ROBAXIN) 750 MG tablet Take 1 tablet by mouth Every 6 (Six) Hours As Needed for Muscle Spasms. 40 tablet 0   • Multiple Vitamin (multivitamin) capsule Take 1 capsule by mouth Daily.     • multivitamin with minerals tablet tablet Take 1 tablet by mouth Daily. (Patient not taking: Reported on 5/6/2025)     • NON FORMULARY Apply 1 dose topically to the appropriate area as directed Daily. Topical estrodial cream     • NON FORMULARY Take 1 tablet by mouth Every Night. Progresterone capsule- pt cannot recall dose (Patient not taking: Reported on 5/6/2025)     • ondansetron (ZOFRAN) 4 MG tablet Take 1 tablet by mouth As Needed for Nausea or Vomiting.     • predniSONE (DELTASONE) 10 MG tablet As Needed.     • Progesterone (Prometrium) 100 MG capsule Take 1 capsule by mouth Daily. 30 capsule 3   • psyllium (METAMUCIL) 58.6 % packet Take 1 packet by mouth Daily.     • sertraline (ZOLOFT) 25 MG tablet Take 1 tablet by mouth Every Night.     • valACYclovir (VALTREX) 1000 MG tablet Take 2 tablets by mouth Daily. (Patient taking differently: Take 2 tablets by mouth As Needed.) 30 tablet 3     Current Facility-Administered Medications   Medication Dose Route Frequency Provider Last Rate Last Admin   • fentaNYL citrate (PF) (SUBLIMAZE) injection 50 mcg  50 mcg Intravenous Once Render, Sam Mahan MD       • iopamidol (ISOVUE-M 200) injection 41%  10 mL Epidural Once in imaging Render, Sam Mahan MD       • lidocaine (XYLOCAINE) 1 % injection 1 mL  1 mL Intradermal Once  "Sam Franklin MD       • methylPREDNISolone acetate (DEPO-medrol) injection 80 mg  80 mg Epidural Once Sam Franklin MD       • midazolam (VERSED) injection 1 mg  1 mg Intravenous Once PRN Sam Franklin MD           --------------------------------------------------------------------------------  Assessment & Plan      Anesthesia Evaluation           Pain impairs ability to perform ADLs: Ambulation, Exercise/Activity and Housekeeping  Modalities previously tried to control pain with limited effectiveness within the last 4-6 weeks: OTC medications, Physical therapy and Rest     Airway   Mallampati: II  Neck ROM: full  Dental      Pulmonary    Cardiovascular     Rhythm: regular    (+) hypertension    PE comment: Posterior tibial pulses are palpable bilaterally    Neuro/Psych  (+) psychiatric history  (-) normal sensory deficit, left straight leg raise test, right straight leg raise test  GI/Hepatic/Renal/Endo      Musculoskeletal (-) normal exam    Abdominal    Substance History      OB/GYN          Other               Diagnosis and Plan    Treatment Plan  ASA 2   Patient has had previous injection/procedure with % improvement.   Procedures: Lumbar Epidural Steroid Injection(LESI), With fluoroscopy,      Anesthetic plan and risks discussed with patient.      Discussed with patient risk and benefits of YEIMI including but not limited to : Bleeding, infection, PDPH, inadvertant spinal anesthetic, worsening pain, hyperglycemia, hypertension, CHF, nerve damage, steroid \"toxicity\" and AVN of hips.  Pt agrees to proceed.  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